# Patient Record
Sex: FEMALE | Race: WHITE | NOT HISPANIC OR LATINO | Employment: OTHER | URBAN - METROPOLITAN AREA
[De-identification: names, ages, dates, MRNs, and addresses within clinical notes are randomized per-mention and may not be internally consistent; named-entity substitution may affect disease eponyms.]

---

## 2021-10-23 ENCOUNTER — TELEPHONE (OUTPATIENT)
Dept: SCHEDULING | Facility: IMAGING CENTER | Age: 61
End: 2021-10-23

## 2021-10-26 ENCOUNTER — OFFICE VISIT (OUTPATIENT)
Dept: MEDICAL GROUP | Facility: LAB | Age: 61
End: 2021-10-26
Payer: COMMERCIAL

## 2021-10-26 VITALS
BODY MASS INDEX: 37.07 KG/M2 | DIASTOLIC BLOOD PRESSURE: 78 MMHG | HEART RATE: 76 BPM | SYSTOLIC BLOOD PRESSURE: 120 MMHG | OXYGEN SATURATION: 98 % | TEMPERATURE: 97.5 F | WEIGHT: 236.2 LBS | RESPIRATION RATE: 14 BRPM | HEIGHT: 67 IN

## 2021-10-26 DIAGNOSIS — R41.3 MEMORY CHANGES: ICD-10-CM

## 2021-10-26 DIAGNOSIS — Z00.00 ANNUAL PHYSICAL EXAM: ICD-10-CM

## 2021-10-26 DIAGNOSIS — E66.9 OBESITY (BMI 35.0-39.9 WITHOUT COMORBIDITY): ICD-10-CM

## 2021-10-26 DIAGNOSIS — G47.39 SLEEP APNEA-LIKE BEHAVIOR: ICD-10-CM

## 2021-10-26 DIAGNOSIS — Z76.89 ENCOUNTER TO ESTABLISH CARE WITH NEW DOCTOR: ICD-10-CM

## 2021-10-26 PROBLEM — R19.8 GI SYMPTOMS: Status: ACTIVE | Noted: 2021-04-30

## 2021-10-26 PROBLEM — K62.5 RECTAL BLEEDING: Status: ACTIVE | Noted: 2020-10-27

## 2021-10-26 PROCEDURE — 99204 OFFICE O/P NEW MOD 45 MIN: CPT | Performed by: FAMILY MEDICINE

## 2021-10-27 ENCOUNTER — HOSPITAL ENCOUNTER (OUTPATIENT)
Dept: LAB | Facility: MEDICAL CENTER | Age: 61
End: 2021-10-27
Attending: FAMILY MEDICINE
Payer: COMMERCIAL

## 2021-10-27 DIAGNOSIS — Z00.00 ANNUAL PHYSICAL EXAM: ICD-10-CM

## 2021-10-27 DIAGNOSIS — R41.3 MEMORY CHANGES: ICD-10-CM

## 2021-10-27 LAB
ALBUMIN SERPL BCP-MCNC: 4.6 G/DL (ref 3.2–4.9)
ALBUMIN/GLOB SERPL: 1.6 G/DL
ALP SERPL-CCNC: 84 U/L (ref 30–99)
ALT SERPL-CCNC: 19 U/L (ref 2–50)
ANION GAP SERPL CALC-SCNC: 10 MMOL/L (ref 7–16)
AST SERPL-CCNC: 20 U/L (ref 12–45)
BASOPHILS # BLD AUTO: 0.9 % (ref 0–1.8)
BASOPHILS # BLD: 0.05 K/UL (ref 0–0.12)
BILIRUB SERPL-MCNC: 0.4 MG/DL (ref 0.1–1.5)
BUN SERPL-MCNC: 16 MG/DL (ref 8–22)
CALCIUM SERPL-MCNC: 9.6 MG/DL (ref 8.5–10.5)
CHLORIDE SERPL-SCNC: 105 MMOL/L (ref 96–112)
CHOLEST SERPL-MCNC: 217 MG/DL (ref 100–199)
CO2 SERPL-SCNC: 26 MMOL/L (ref 20–33)
CREAT SERPL-MCNC: 0.79 MG/DL (ref 0.5–1.4)
EOSINOPHIL # BLD AUTO: 0.39 K/UL (ref 0–0.51)
EOSINOPHIL NFR BLD: 7.3 % (ref 0–6.9)
ERYTHROCYTE [DISTWIDTH] IN BLOOD BY AUTOMATED COUNT: 42.9 FL (ref 35.9–50)
FASTING STATUS PATIENT QL REPORTED: NORMAL
GLOBULIN SER CALC-MCNC: 2.9 G/DL (ref 1.9–3.5)
GLUCOSE SERPL-MCNC: 101 MG/DL (ref 65–99)
HCT VFR BLD AUTO: 43.9 % (ref 37–47)
HDLC SERPL-MCNC: 56 MG/DL
HGB BLD-MCNC: 14.9 G/DL (ref 12–16)
IMM GRANULOCYTES # BLD AUTO: 0.02 K/UL (ref 0–0.11)
IMM GRANULOCYTES NFR BLD AUTO: 0.4 % (ref 0–0.9)
LDLC SERPL CALC-MCNC: 141 MG/DL
LYMPHOCYTES # BLD AUTO: 2.39 K/UL (ref 1–4.8)
LYMPHOCYTES NFR BLD: 44.8 % (ref 22–41)
MCH RBC QN AUTO: 31.6 PG (ref 27–33)
MCHC RBC AUTO-ENTMCNC: 33.9 G/DL (ref 33.6–35)
MCV RBC AUTO: 93.2 FL (ref 81.4–97.8)
MONOCYTES # BLD AUTO: 0.5 K/UL (ref 0–0.85)
MONOCYTES NFR BLD AUTO: 9.4 % (ref 0–13.4)
NEUTROPHILS # BLD AUTO: 1.99 K/UL (ref 2–7.15)
NEUTROPHILS NFR BLD: 37.2 % (ref 44–72)
NRBC # BLD AUTO: 0 K/UL
NRBC BLD-RTO: 0 /100 WBC
PLATELET # BLD AUTO: 222 K/UL (ref 164–446)
PMV BLD AUTO: 9.9 FL (ref 9–12.9)
POTASSIUM SERPL-SCNC: 4.4 MMOL/L (ref 3.6–5.5)
PROT SERPL-MCNC: 7.5 G/DL (ref 6–8.2)
RBC # BLD AUTO: 4.71 M/UL (ref 4.2–5.4)
SODIUM SERPL-SCNC: 141 MMOL/L (ref 135–145)
T4 FREE SERPL-MCNC: 1.18 NG/DL (ref 0.93–1.7)
TRIGL SERPL-MCNC: 101 MG/DL (ref 0–149)
TSH SERPL DL<=0.005 MIU/L-ACNC: 1.83 UIU/ML (ref 0.38–5.33)
VIT B12 SERPL-MCNC: 682 PG/ML (ref 211–911)
WBC # BLD AUTO: 5.3 K/UL (ref 4.8–10.8)

## 2021-10-27 PROCEDURE — 80053 COMPREHEN METABOLIC PANEL: CPT

## 2021-10-27 PROCEDURE — 80061 LIPID PANEL: CPT

## 2021-10-27 PROCEDURE — 36415 COLL VENOUS BLD VENIPUNCTURE: CPT

## 2021-10-27 PROCEDURE — 84443 ASSAY THYROID STIM HORMONE: CPT

## 2021-10-27 PROCEDURE — 85025 COMPLETE CBC W/AUTO DIFF WBC: CPT

## 2021-10-27 PROCEDURE — 84439 ASSAY OF FREE THYROXINE: CPT

## 2021-10-27 PROCEDURE — 82607 VITAMIN B-12: CPT

## 2021-10-27 NOTE — PROGRESS NOTES
CC: Here to establish care    HPI: New patient  Claudia presents today to establish care, 61 years old female with past medical history significant for obesity, moved recently from [place].  Discussed the following today:    1. 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    2. Obesity (BMI 35.0-39.9 without comorbidity)  BMI 36.9, patient is with ready mindset to start losing weight, discussed with the patient to schedule an appointment for official weight loss medical weight loss and obesity counseling.  Requested labs today    3. Memory changes  New concern, patient said she has been noticing that she is her memory is not as sharp as she used to be, and this has been going on for at least more than 1 year now.  Denies depression or stress or anxiety.  No recent blood work or check for her thyroid function test.    4. Annual physical exam  Patient due for annual labs    5. Sleep apnea-like behavior  As per her partner who is accompanying her today, patient snores all night long, not sure if she stops breathing, patient with other risk factor class III obesity.  Partner with obstructive sleep apnea using CPAP and he said he used to snore when he was diagnosed with sleep apnea.  Patient agrees and would like to screen for sleep apnea.  Currently above    Patient Active Problem List    Diagnosis Date Noted   • Encounter to establish care with new doctor 10/26/2021   • Obesity (BMI 35.0-39.9 without comorbidity) 10/26/2021   • GI symptoms 04/30/2021   • Rectal bleeding 10/27/2020   • Prediabetes 03/23/2011   • Intrinsic urethral sphincter deficiency 08/24/2010       No current outpatient medications on file.     No current facility-administered medications for this visit.         Allergies as of 10/26/2021   •  (No Known Allergies)        ROS: Denies any chest pain, Shortness of breath, Changes bowel or bladder, Lower extremity edema.    Physical Exam:  Gen.: Well-developed, well-nourished, no apparent distress,pleasant and cooperative with the examination  Skin:  Warm and dry with good turgor. No rashes or suspicious lesions in visible areas  Eye: PERRLA, conjunctiva and sclera clear, lids normal  HEENT: Normocephalic/atraumatic, sinuses nontender with palpation, TMs clear, nares patent with pink mucosa and clear rhinorrhea, lips without lesions, oropharynx clear.  Neck: Trachea midline,no masses or adenopathy  Thyroid: normal consistency and size. No masses or nodules. Not tender with palpation.  Cor: Regular rate and rhythm without murmur, gallop or rub.  Lungs: Respirations unlabored.Clear to auscultation with equal breath sounds bilaterally. No wheezes, rhonchi.  Abdomen: Soft nontender without hepatosplenomegaly or masses appreciated, normoactive bowel sounds. No hernias.  Extremities: No cyanosis, clubbing or edema, Symmetrical without deformities or malformations. Pulses 2+ and symmetrical both upper and lower extremities  Lymphatic: No abnormal adenopathy of the neck groin or axillae.  Psych: Alert and oriented x 3.Normal affect, judgement,insight and memory.        Assessment and Plan.   61 y.o. female here to establish care    1. Encounter to establish care with new doctor  Reviewed medical history today as above, health maintenance topic reviewed, up-to-date    2. Obesity (BMI 35.0-39.9 without comorbidity)  Chronic problem, not controlled patient will schedule medical weight loss appointment for further evaluation and management    3. Memory changes  New problem, chronic for the patient, rule out medical causes like thyroid disease.  Will further evaluate next visit.  - TSH; Future  - FREE THYROXINE; Future  - VITAMIN B12; Future    4. Annual physical exam  Due for annual labs  - CBC WITH DIFFERENTIAL;  Future  - Comp Metabolic Panel; Future  - TSH; Future  - FREE THYROXINE; Future  - Lipid Profile; Future    5. Sleep apnea-like behavior  Referred for sleep studies.,  Discussed long-term management with weight loss  - REFERRAL TO PULMONARY AND SLEEP MEDICINE    Please note that this dictation was created using voice recognition software. I have made every reasonable attempt to correct obvious errors but there may be errors of grammar and content that I may have overlooked prior to finalization of this note.

## 2021-11-23 ENCOUNTER — OFFICE VISIT (OUTPATIENT)
Dept: MEDICAL GROUP | Facility: LAB | Age: 61
End: 2021-11-23
Payer: COMMERCIAL

## 2021-11-23 ENCOUNTER — HOSPITAL ENCOUNTER (OUTPATIENT)
Dept: RADIOLOGY | Facility: MEDICAL CENTER | Age: 61
End: 2021-11-23
Attending: FAMILY MEDICINE
Payer: COMMERCIAL

## 2021-11-23 VITALS
HEART RATE: 67 BPM | OXYGEN SATURATION: 95 % | SYSTOLIC BLOOD PRESSURE: 124 MMHG | RESPIRATION RATE: 16 BRPM | BODY MASS INDEX: 37.04 KG/M2 | DIASTOLIC BLOOD PRESSURE: 86 MMHG | WEIGHT: 236 LBS | TEMPERATURE: 97.3 F | HEIGHT: 67 IN

## 2021-11-23 DIAGNOSIS — M25.562 ACUTE PAIN OF LEFT KNEE: ICD-10-CM

## 2021-11-23 DIAGNOSIS — E78.49 OTHER HYPERLIPIDEMIA: ICD-10-CM

## 2021-11-23 DIAGNOSIS — E66.9 OBESITY (BMI 35.0-39.9 WITHOUT COMORBIDITY): ICD-10-CM

## 2021-11-23 PROBLEM — Z12.11 ENCOUNTER FOR SCREENING COLONOSCOPY: Status: ACTIVE | Noted: 2020-12-15

## 2021-11-23 PROBLEM — Z11.59 ENCOUNTER FOR SCREENING FOR OTHER VIRAL DISEASES: Status: ACTIVE | Noted: 2019-03-25

## 2021-11-23 PROCEDURE — 73562 X-RAY EXAM OF KNEE 3: CPT | Mod: LT

## 2021-11-23 PROCEDURE — 99214 OFFICE O/P EST MOD 30 MIN: CPT | Performed by: FAMILY MEDICINE

## 2021-11-23 ASSESSMENT — FIBROSIS 4 INDEX: FIB4 SCORE: 1.240085047948982518

## 2021-11-24 NOTE — PROGRESS NOTES
Chief Complaint:   Chief Complaint   Patient presents with   • Weight Check     initial weight management   • Knee Pain     left knee       HPI: Established patient  Claudia Killian is a 61 y.o. female who presents for follow-up, discussed the following concerns as follow today:  Reviewed all lab work results and discussed with the patient today    1. Acute pain of left knee  Patient reports that she has been experiencing this pain for the past few weeks, denies any injury or trauma but she has been regularly exercising.  Notes more pain when she is climbing the stairs or walking for long time.  No direct injury or trauma or fall or tripping.  Denies instability of the joint or locking.    2. Obesity (BMI 35.0-39.9 without comorbidity)    Goal: Patient has long-term and short-term goal, her long-term is to be around 156, short-term goal to reach below 200.        Diet: Tries to eat high-protein diet low-carb.  And restrict calories.  Exercise and physical activity: Exercise indefinitely more than 300 minutes/week, different types of exercise and walking    Fluids intake and water: Patient says she does not drink even 2 L of water per day    Medications: No medications at this time      Previous trials for weight loss: Different commercial trials, like weight watchers, Cheri Russell, etc. also tried medical weight loss through [facility] which helped her lose and reach the weight of 215.  But she stopped following directions after that    Behavioral/emotional history:Challenges and barriers to weight loss:  She said should there is no specific barriers but she feels frustrated when she does not lose weight    Lab review: Reviewed labs, lipid profile discussed with the patient that is the abnormal concern today    3.hyperlipidemia  Calculated cardiac risk 3.5%    Past medical history, family history, social history and medications reviewed and updated in the record.   Current medications, problem list and allergies reviewed  in [facility]  Health maintenance topics are reviewed and updated.    Patient Active Problem List    Diagnosis Date Noted   • Other hyperlipidemia 11/23/2021   • Encounter to establish care with new doctor 10/26/2021   • Obesity (BMI 35.0-39.9 without comorbidity) 10/26/2021   • GI symptoms 04/30/2021   • Rectal bleeding 10/27/2020   • Prediabetes 03/23/2011   • Intrinsic urethral sphincter deficiency 08/24/2010     Family History   Problem Relation Age of Onset   • Dementia Mother    • Hypertension Mother      Social History     Socioeconomic History   • Marital status:      Spouse name: Not on file   • Number of children: Not on file   • Years of education: Not on file   • Highest education level: Not on file   Occupational History     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    Tobacco Use   • Smoking status: Never Smoker   • Smokeless tobacco: Never Used   Vaping Use   • Vaping Use: Not on file   Substance and Sexual Activity   • Alcohol use: Not on file     Comment: wine    • Drug use: Never   • Sexual activity: Yes     Partners: Male   Other Topics Concern   • Not on file   Social History Narrative   • Not on file     Social Determinants of Health     Financial Resource Strain:    • Difficulty of Paying Living Expenses: Not on file   Food Insecurity:    • Worried About Running Out of Food in the Last Year: Not on file   • Ran Out of Food in the Last Year: Not on file   Transportation Needs:    • Lack of Transportation (Medical): Not on file   • Lack of Transportation (Non-Medical): Not on file   Physical Activity:    • Days of Exercise per Week: Not on file   • Minutes of Exercise per Session: Not on file   Stress:    • Feeling of Stress : Not on file   Social Connections:    • Frequency of Communication with Friends and Family: Not on file   • Frequency of Social Gatherings with Friends and Family: Not on file   • Attends Denominational Services: Not on file   • Active Member of Clubs or Organizations: Not on file   •  "Attends Club or Organization Meetings: Not on file   • Marital Status: Not on file   Intimate Partner Violence:    • Fear of Current or Ex-Partner: Not on file   • Emotionally Abused: Not on file   • Physically Abused: Not on file   • Sexually Abused: Not on file   Housing Stability:    • Unable to Pay for Housing in the Last Year: Not on file   • Number of Places Lived in the Last Year: Not on file   • Unstable Housing in the Last Year: Not on file       No current outpatient medications on file.     No current facility-administered medications for this visit.         Review Of Systems  As documented in HPI above  PHYSICAL EXAMINATION:    /86 (BP Location: Right arm, Patient Position: Sitting, BP Cuff Size: Adult)   Pulse 67   Temp 36.3 °C (97.3 °F) (Temporal)   Resp 16   Ht 1.702 m (5' 7\")   Wt 107 kg (236 lb)   SpO2 95%   BMI 36.96 kg/m²   Gen.: Well-developed, well-nourished, no apparent distress, pleasant and cooperative with the examination  HEENT: Normocephalic/atraumatic,   Neck: No JVD or bruits, no adenopathy  Cor: Regular rate and rhythm without murmur gallop or rub  Lungs: Clear to auscultation with equal breath sounds bilaterally. No wheezes, rhonchi.  Abdomen: Soft nontender without hepatosplenomegaly or masses appreciated, normoactive bowel sounds  Extremities: No cyanosis, clubbing or edema  Knee exam benign except for mild cracking sensation    ASSESSMENT/Plan:  1. Acute pain of left knee   new concern, discussed with the patient to do an x-ray, and will send her to do physical therapy as a means to strengthen the quadriceps muscle.  DX-KNEE 3 VIEWS LEFT    Referral to Physical Therapy   2. Obesity (BMI 35.0-39.9 without comorbidity)   new, chronic problem for the patient, uncontrolled.  Discussed the following today:    Diet: After calculating patient BMR which is around 1600 advised to cut back 500 arcadio/day from her BMR calculated, which will put her on a range of 1400 to 1200 " arcadio/day  Protein around 60 to 70 g/day  Diet mainly consistent of high-protein, low carb, low fiber, low fat.  Patient to take 3 meals and 1 snack per day and she can increase it to 2 snacks if she is feeling hungry.  Mainly the breakfast and lunch would be meal substitute, patient preferred to use protein bar and protein shake, discussed with the patient the criteria to choose her protein shakes or bars for calorie protein ratio as 10:1.  Fluids intake will advise water around 64 or more ounces per day  Avoid juices and sodas, allowed for diet soda occasionally, no artificial sweeteners  Exercise   4-5 times a week 30 minutes of walk will increase gradually, no barriers for exercise activity   3. Other hyperlipidemia   new, low cardiac risk 3.5%, discussed weight loss and healthy lifestyle changes       Please note that this dictation was created using voice recognition software. I have made every reasonable attempt to correct obvious errors but there may be errors of grammar and content that I may have overlooked prior to finalization of this note.

## 2021-12-14 ENCOUNTER — OFFICE VISIT (OUTPATIENT)
Dept: MEDICAL GROUP | Facility: LAB | Age: 61
End: 2021-12-14
Payer: COMMERCIAL

## 2021-12-14 VITALS
RESPIRATION RATE: 16 BRPM | HEART RATE: 85 BPM | DIASTOLIC BLOOD PRESSURE: 86 MMHG | TEMPERATURE: 96.8 F | WEIGHT: 227 LBS | HEIGHT: 67 IN | SYSTOLIC BLOOD PRESSURE: 124 MMHG | OXYGEN SATURATION: 95 % | BODY MASS INDEX: 35.63 KG/M2

## 2021-12-14 DIAGNOSIS — E66.9 OBESITY (BMI 35.0-39.9 WITHOUT COMORBIDITY): ICD-10-CM

## 2021-12-14 PROCEDURE — 99213 OFFICE O/P EST LOW 20 MIN: CPT | Performed by: FAMILY MEDICINE

## 2021-12-14 ASSESSMENT — FIBROSIS 4 INDEX: FIB4 SCORE: 1.240085047948982518

## 2021-12-14 NOTE — PROGRESS NOTES
Chief Complaint:   Chief Complaint   Patient presents with   • Weight Check       HPI: Established patient  Claudia Killian is a 61 y.o. female who presents for    Obesity (BMI 35.0-39.9 without comorbidity)    Patient lost 10 pounds since last visit couple of weeks ago, patient said she continues to exercise definitely more than 300 minutes/week, drinks around 2 L of water daily, and watches her proteins to eat around 60 g/day, along with calorie deficiency to around 1400.  Denies any problems with the routine and the regimen that we discussed earlier, patient said she feels okay and she feels motivated.    Reports that her knee pain has resolved at this time.    Past medical history, family history, social history and medications reviewed and updated in the record.  Today  Current medications, problem list and allergies reviewed in [facility] today  Health maintenance topics are reviewed and updated.    Patient Active Problem List    Diagnosis Date Noted   • Other hyperlipidemia 11/23/2021   • Encounter to establish care with new doctor 10/26/2021   • Obesity (BMI 35.0-39.9 without comorbidity) 10/26/2021   • GI symptoms 04/30/2021   • Encounter for screening colonoscopy 12/15/2020   • Rectal bleeding 10/27/2020   • Encounter for screening for other viral diseases 03/25/2019   • Prediabetes 03/23/2011   • Intrinsic urethral sphincter deficiency 08/24/2010     Family History   Problem Relation Age of Onset   • Dementia Mother    • Hypertension Mother      Social History     Socioeconomic History   • Marital status:      Spouse name: Not on file   • Number of children: Not on file   • Years of education: Not on file   • Highest education level: Not on file   Occupational History     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    Tobacco Use   • Smoking status: Never Smoker   • Smokeless tobacco: Never Used   Vaping Use   • Vaping Use: Not on file   Substance and Sexual Activity   • Alcohol use: Not on file     Comment: wine   "  • Drug use: Never   • Sexual activity: Yes     Partners: Male   Other Topics Concern   • Not on file   Social History Narrative   • Not on file     Social Determinants of Health     Financial Resource Strain:    • Difficulty of Paying Living Expenses: Not on file   Food Insecurity:    • Worried About Running Out of Food in the Last Year: Not on file   • Ran Out of Food in the Last Year: Not on file   Transportation Needs:    • Lack of Transportation (Medical): Not on file   • Lack of Transportation (Non-Medical): Not on file   Physical Activity:    • Days of Exercise per Week: Not on file   • Minutes of Exercise per Session: Not on file   Stress:    • Feeling of Stress : Not on file   Social Connections:    • Frequency of Communication with Friends and Family: Not on file   • Frequency of Social Gatherings with Friends and Family: Not on file   • Attends Muslim Services: Not on file   • Active Member of Clubs or Organizations: Not on file   • Attends Club or Organization Meetings: Not on file   • Marital Status: Not on file   Intimate Partner Violence:    • Fear of Current or Ex-Partner: Not on file   • Emotionally Abused: Not on file   • Physically Abused: Not on file   • Sexually Abused: Not on file   Housing Stability:    • Unable to Pay for Housing in the Last Year: Not on file   • Number of Places Lived in the Last Year: Not on file   • Unstable Housing in the Last Year: Not on file     No current outpatient medications on file.     No current facility-administered medications for this visit.           Review Of Systems  As documented in HPI above  PHYSICAL EXAMINATION:    /86 (BP Location: Right arm, Patient Position: Sitting, BP Cuff Size: Adult)   Pulse 85   Temp 36 °C (96.8 °F) (Temporal)   Resp 16   Ht 1.702 m (5' 7\")   Wt 103 kg (227 lb) Comment: 50.5inch waist  SpO2 95%   BMI 35.55 kg/m²   Gen.: Well-developed, well-nourished, no apparent distress, pleasant and cooperative with the " examination  HEENT: Normocephalic/atraumatic,   Neck: No JVD or bruits, no adenopathy  Cor: Regular rate and rhythm without murmur gallop or rub  Lungs: Clear to auscultation with equal breath sounds bilaterally. No wheezes, rhonchi.    Extremities: No cyanosis, clubbing or edema          ASSESSMENT/Plan:  1. Obesity (BMI 35.0-39.9 without comorbidity)   chronic, better controlled.  Lost 10 pounds, congratulated and advised to continue same regimen high-protein diet high fiber, low carb and fat, exercise more than 150 minutes/week, continue and increase hydration and water intake to above 64 ounces daily, and calorie deficiency at [id number].per day        Please note that this dictation was created using voice recognition software. I have made every reasonable attempt to correct obvious errors but there may be errors of grammar and content that I may have overlooked prior to finalization of this note.

## 2022-01-06 ENCOUNTER — OFFICE VISIT (OUTPATIENT)
Dept: SLEEP MEDICINE | Facility: MEDICAL CENTER | Age: 62
End: 2022-01-06
Payer: COMMERCIAL

## 2022-01-06 VITALS
RESPIRATION RATE: 16 BRPM | HEIGHT: 67 IN | DIASTOLIC BLOOD PRESSURE: 84 MMHG | BODY MASS INDEX: 36.41 KG/M2 | OXYGEN SATURATION: 96 % | WEIGHT: 232 LBS | SYSTOLIC BLOOD PRESSURE: 128 MMHG | HEART RATE: 71 BPM

## 2022-01-06 DIAGNOSIS — G47.30 SLEEP DISORDER BREATHING: ICD-10-CM

## 2022-01-06 PROCEDURE — 99203 OFFICE O/P NEW LOW 30 MIN: CPT | Performed by: FAMILY MEDICINE

## 2022-01-06 ASSESSMENT — FIBROSIS 4 INDEX: FIB4 SCORE: 1.240085047948982518

## 2022-01-06 NOTE — PROGRESS NOTES
"  [facility]  Consult Note     Date: 1/6/2022 / Time: 9:00 AM    Patient ID:   Name:             Claudia Killian   YOB: 1960  Age:                 61 y.o.  female   MRN:               [id number]      Thank you for requesting a sleep medicine consultation on Claudia Killian at the sleep center. He presents today with the chief complaints of snoring, pause in breathing and gasping breath. She is referred by Dr. Chapa for evaluation and treatment of sleep disorder breathing .     HISTORY OF PRESENT ILLNESS:       At night,  Claudia Killian goes to bed around 10-11 pm on weekdays and around on the weekends. She gets out of bed at 7-9 am on weekdays and on the weekends.  She averages about 8-10 hrs of sleep on a good night. She falls asleep within 5-10 minutes. She wakes up occasionally during the night due to bathroom use and on average It takes her few min to fall back asleep. She is  aware of snoring, breathing pauses and gasping in sleep.  She  denies any symptoms of restless legs syndrome such as an \"urge to move\"  She  legs in the evening or bedtime. She  denies any symptoms of narcolepsy such as sleep paralysis or cataplexy, or any symptoms to suggest parasomnias such as sleep walking or acting out of dreams. She  has not used any medications for the sleep problem.Overall, she does finds her sleep refreshing.In terms of  excessive daytime sleepiness,  She complains of sleepiness while reading, watching TV however denies while driving.She  does not take regular naps.She drinks about 1-3 caffeinated beverages per day.      REVIEW OF SYSTEMS:       Constitutional: Denies fevers, Denies weight changes  Eyes: Denies changes in vision, no eye pain  Ears/Nose/Throat/Mouth: Denies nasal congestion or sore throat   Cardiovascular: Denies chest pain or palpitations   Respiratory: Denies shortness of breath , Denies cough  Gastrointestinal/Hepatic: Denies abdominal pain, nausea, vomiting, diarrhea, " "constipation or GI bleeding   Genitourinary: Denies bladder dysfunction, dysuria or frequency  Musculoskeletal/Rheum: Denies  joint pain and swelling   Skin/Breast: Denies rash  Neurological: Denies headache, confusion, memory loss or focal weakness/parasthesias  Psychiatric: denies mood disorder     Comprehensive review of systems form is reviewed with the patient and is attached in the EMR.     PMH:  has a past medical history of Chickenpox and [place] measles.  MEDS: No current outpatient medications on file.  ALLERGIES: No Known Allergies  SURGHX: History reviewed. No pertinent surgical history.  SOCHX:  reports that she has never smoked. She has never used smokeless tobacco. She reports current alcohol use. She reports that she does not use drugs.   FH:   Family History   Problem Relation Age of Onset   • Dementia Mother    • Hypertension Mother        Physical Exam:  Vitals/ General Appearance:   Weight/BMI: Body mass index is 36.34 kg/m².  /84 (BP Location: Left arm, Patient Position: Sitting, BP Cuff Size: Adult)   Pulse 71   Resp 16   Ht 1.702 m (5' 7\")   Wt 105 kg (232 lb)   SpO2 96%   Vitals:    01/06/22 0843   BP: 128/84   BP Location: Left arm   Patient Position: Sitting   BP Cuff Size: Adult   Pulse: 71   Resp: 16   SpO2: 96%   Weight: 105 kg (232 lb)   Height: 1.702 m (5' 7\")           Constitutional: Alert, no distress, well-groomed.  Skin: No rashes in visible areas.  Eye: Round. Conjunctiva clear, lids normal. No icterus.   ENMT: Lips pink without lesions, good dentition, moist mucous membranes. Phonation normal.  Neck: No masses, no thyromegaly. Moves freely without pain.  CV: Pulse as reported by patient  Respiratory: Unlabored respiratory effort, no cough or audible wheeze  Psych: Alert and oriented x3, normal affect and mood.   INVESTIGATIONS:       ASSESSMENT AND PLAN     1. She  has symptoms of Obstructive Sleep Apnea (CLARK). The pathophysiology of CLARK and the increased risk of " cardiovascular morbidity from untreated CLARK is discussed in detail with the patient. We have discussed diagnostic options including in-laboratory, attended polysomnography and home sleep testing. We have also discussed treatment options including airway pressurization, reconstructive otolaryngologic surgery, dental appliances and weight management.       Subsequently,treatment options will be discussed based on the diagnostic study. Meanwhile, She is urged to avoid supine sleep, weight gain and alcoholic beverages since all of these can worsen CLARK. She is cautioned against drowsy driving. If She feels sleepy while driving, She must pull over for a break/nap, rather than persist on the road, in the interest of She own safety and that of others on the road.    Plan  -  She  will be scheduled for an overnight HST to assess sleep related  breathing disorder.    2. Regarding treatment of other past medical problems and general health maintenance,  She is urged to follow up with PCP.

## 2022-02-08 ENCOUNTER — HOME STUDY (OUTPATIENT)
Dept: SLEEP MEDICINE | Facility: MEDICAL CENTER | Age: 62
End: 2022-02-08
Payer: COMMERCIAL

## 2022-02-08 DIAGNOSIS — G47.30 SLEEP DISORDER BREATHING: ICD-10-CM

## 2022-02-08 PROCEDURE — 95806 SLEEP STUDY UNATT&RESP EFFT: CPT | Performed by: FAMILY MEDICINE

## 2022-02-15 NOTE — PROCEDURES
DIAGNOSTIC HOME SLEEP TEST (HST) REPORT       PATIENT ID:  NAME:  Claudia Killian  MRN:               [id number]  YOB: 1960  DATE OF STUDY: 2/8/22      Impression:     This study shows evidence of:     1.Severe obstructive sleep apnea with  Respiratory Event Index (MISSAEL) of 42.8 per hour and worse in supine sleep with MISSAEL at 73.9. These findings are based on the recording time (flow evaluation time). It is not possible with this device to determine a traditional apnea+hypopnea index (AHI) for total sleep time since EEG channels are not available.     2. Nocturnal hypoxia: O2 Sat. griselda was 82% and mean O2 sat was 90% and baseline O2 at 93 %. O2 sat was below 88% for 19% of the flow evaluation time. Oxygen Desaturation (>=3%) Index was elevated at 45.2/hr. AVG HR was 65 BPM.      TECHNICAL DESCRIPTION:  [email or web address] Device used was a type-III home study device. Home sleep study recording included: Airflow recording by nasal pressure transducer; Respiratory Effort by abdominal Respiratory Bands; O2 by finger oximetry. A position sensor and a snore channel was also used.    Scoring Criteria: A modification of the the AASM Manual for the Scoring of Sleep and Associated Events, 2012, was used.   Obstructive apnea was scored by cessation of airflow for at least 10 seconds with continuing respiratory effort.  Central apnea was scored by cessation of airflow for at least 10 seconds with no effort.  Hypopnea was scored by a 30% or more reduction in airflow for at least 10 seconds accompanied by an arterial oxygen desaturation of 3% or more.  (For Medicare patients, hypopneas were scored by a 30% or more reduction in airflow for at least 10 seconds accompanied by an arterial oxygen saturation of 4% or more, as required by their insurance, CMS.        General sleep summary: . Total recording time is 8 hours and 12 minutes and total flow evaluation time is 7 hours and 56 minutes. The patient spent 2 hours and 48  minutes in the supine position.    Respiratory events:    Apneas: 42 (Obstructive apnea index 4/hr, Central apnea index 1.3 /hr, mixed 0 /hour)  Hypopneas: 300    Recommendations:    1. CPAP titration study vs Auto CPAP trial .   2.   In general patients with sleep apnea are advised to avoid alcohol and sedatives and to not operate a motor vehicle while drowsy. In some cases alternative treatment options may prove effective in resolving sleep apnea in these options include upper airway surgery, the use of a dental orthotic or weight loss and positional therapy. Clinical correlation is required.         Boaz Colon MD

## 2022-03-10 ENCOUNTER — OFFICE VISIT (OUTPATIENT)
Dept: SLEEP MEDICINE | Facility: MEDICAL CENTER | Age: 62
End: 2022-03-10
Payer: COMMERCIAL

## 2022-03-10 VITALS
OXYGEN SATURATION: 98 % | RESPIRATION RATE: 16 BRPM | BODY MASS INDEX: 35.47 KG/M2 | DIASTOLIC BLOOD PRESSURE: 82 MMHG | HEIGHT: 67 IN | SYSTOLIC BLOOD PRESSURE: 134 MMHG | HEART RATE: 61 BPM | WEIGHT: 226 LBS

## 2022-03-10 DIAGNOSIS — G47.33 OSA (OBSTRUCTIVE SLEEP APNEA): ICD-10-CM

## 2022-03-10 PROCEDURE — 99214 OFFICE O/P EST MOD 30 MIN: CPT | Performed by: FAMILY MEDICINE

## 2022-03-10 ASSESSMENT — FIBROSIS 4 INDEX: FIB4 SCORE: 1.26

## 2022-03-10 ASSESSMENT — PATIENT HEALTH QUESTIONNAIRE - PHQ9: CLINICAL INTERPRETATION OF PHQ2 SCORE: 0

## 2022-03-10 NOTE — PATIENT INSTRUCTIONS
"     CPAP and BPAP Information  CPAP and BPAP are methods of helping a person breathe with the use of air pressure. CPAP stands for \"continuous positive airway pressure.\" BPAP stands for \"bi-level positive airway pressure.\" In both methods, air is blown through your nose or mouth and into your air passages to help you breathe well.  CPAP and BPAP use different amounts of pressure to blow air. With CPAP, the amount of pressure stays the same while you breathe in and out. With BPAP, the amount of pressure is increased when you breathe in (inhale) so that you can take larger breaths. Your health care provider will recommend whether CPAP or BPAP would be more helpful for you.  Why are CPAP and BPAP treatments used?  CPAP or BPAP can be helpful if you have:  · Sleep apnea.  · Chronic obstructive pulmonary disease (COPD).  · Heart failure.  · Medical conditions that weaken the muscles of the chest including muscular dystrophy, or neurological diseases such as amyotrophic lateral sclerosis (ALS).  · Other problems that cause breathing to be weak, abnormal, or difficult.  CPAP is most commonly used for obstructive sleep apnea (CLARK) to keep the airways from collapsing when the muscles relax during sleep.  How is CPAP or BPAP administered?  Both CPAP and BPAP are provided by a small machine with a flexible plastic tube that attaches to a plastic mask. You wear the mask. Air is blown through the mask into your nose or mouth. The amount of pressure that is used to blow the air can be adjusted on the machine. Your health care provider will determine the pressure setting that should be used based on your individual needs.  When should CPAP or BPAP be used?  In most cases, the mask only needs to be worn during sleep. Generally, the mask needs to be worn throughout the night and during any daytime naps. People with certain medical conditions may also need to wear the mask at other times when they are awake. Follow instructions from " your health care provider about when to use the machine.  What are some tips for using the mask?    · Because the mask needs to be snug, some people feel trapped or closed-in (claustrophobic) when first using the mask. If you feel this way, you may need to get used to the mask. One way to do this is by holding the mask loosely over your nose or mouth and then gradually applying the mask more snugly. You can also gradually increase the amount of time that you use the mask.  · Masks are available in various types and sizes. Some fit over your mouth and nose while others fit over just your nose. If your mask does not fit well, talk with your health care provider about getting a different one.  · If you are using a mask that fits over your nose and you tend to breathe through your mouth, a chin strap may be applied to help keep your mouth closed.  · The CPAP and BPAP machines have alarms that may sound if the mask comes off or develops a leak.  · If you have trouble with the mask, it is very important that you talk with your health care provider about finding a way to make the mask easier to tolerate. Do not stop using the mask. Stopping the use of the mask could have a negative impact on your health.  What are some tips for using the machine?  · Place your CPAP or BPAP machine on a secure table or stand near an electrical outlet.  · Know where the on/off switch is located on the machine.  · Follow instructions from your health care provider about how to set the pressure on your machine and when you should use it.  · Do not eat or drink while the CPAP or BPAP machine is on. Food or fluids could get pushed into your lungs by the pressure of the CPAP or BPAP.  · Do not smoke. Tobacco smoke residue can damage the machine.  · For home use, CPAP and BPAP machines can be rented or purchased through home health care companies. Many different brands of machines are available. Renting a machine before purchasing may help you  "find out which particular machine works well for you.  · Keep the CPAP or BPAP machine and attachments clean. Ask your health care provider for specific instructions.  Get help right away if:  · You have redness or open areas around your nose or mouth where the mask fits.  · You have trouble using the CPAP or BPAP machine.  · You cannot tolerate wearing the CPAP or BPAP mask.  · You have pain, discomfort, and bloating in your abdomen.  Summary  · CPAP and BPAP are methods of helping a person breathe with the use of air pressure.  · Both CPAP and BPAP are provided by a small machine with a flexible plastic tube that attaches to a plastic mask.  · If you have trouble with the mask, it is very important that you talk with your health care provider about finding a way to make the mask easier to tolerate.  This information is not intended to replace advice given to you by your health care provider. Make sure you discuss any questions you have with your health care provider.  Document Released: 09/15/2005 Document Revised: 04/08/2020 Document Reviewed: 11/06/2017  [employer] Patient Education © 2020 [employer]  Once you receive your new PAP machine from the [employer] company you're referred to, we must see you back for an office visit between 30-90 days of you using the machine to review compliance.  If you were ordered an ASV machine, we need to see you in office no sooner than your 60th day on therapy.      This is a very important time frame for insurance purposes. If you do not follow up with our office for compliance your insurance may not continue to pay for the machine. Also if you do not use the machine for at least 4 hours each night, you may be deemed \"Incompliant\", in which case the insurance may also not continue to pay for the machine.      If you are incompliant, you may have to surrender your machine to the [employer] company and start the process over if you wish to continue therapy after that. " Meaning a new office consult and new sleep studies.     For your first visit back with our office, please bring the whole machine with the power cord. We will download the compliance off the SD card in the machine, and are able to make changes if need be in office. Some machines have a modem and we can access the data wirelessly, if this is the case please make sure the DME company grants us access to your machine.  For all follow up appointments after that, you will only need to bring the SD card to the appointment.     If you are having any issues with the mask, you have a 30 day window to exchange and try something else with your DME company.  If you are having issues with the pressure, please call our office at [phone].  These issues can cause you to not be able to use machine appropriately, there for make you incompliant.    Please call our office if you have any questions.    Thank you, [facility].  [phone]

## 2022-03-10 NOTE — PROGRESS NOTES
[facility] Follow Up Note     Date: 3/10/2022 / Time: 10:28 AM    Patient ID:   Name:             Claudia Killian   YOB: 1960  Age:                 62 y.o.  female   MRN:               [id number]      Thank you for requesting a sleep medicine consultation on Claudia Killian at the sleep center. She presents today with the chief complaints of CLARK and HST follow up.     HISTORY OF PRESENT ILLNESS:       Pt is currently not on therapy.  Denies any significant changes in sleep or medical history.  She goes to sleep around 10-11 pm on weekdays and around on the weekends. She gets out of bed at 7-9 am on weekdays and on the weekends.  She averages about 8-10 hrs of sleep on a good night. She falls asleep within 5-10 minutes. Overall, she does  finds her sleep refreshing. She denies any symptoms of RLS, narcolepsy or any symptoms to suggest parasomnias such as nightmares, sleep walking or acting out of dreams. The symptoms of excessive daytime, snoring and pausing breathing has continued..  Since her last visit she had a home sleep study on 2/8/2022 which showed Severe obstructive sleep apnea with  Respiratory Event Index (MISSAEL) of 42.8 per hour and worse in supine sleep with MISSAEL at 73.9. Nocturnal hypoxia: O2 Sat. griselda was 82% and mean O2 sat was 90% and baseline O2 at 93 %. O2 sat was below 88% for 19% of the flow evaluation time. Oxygen Desaturation (>=3%) Index was elevated at 45.2/hr. AVG HR was 65 BPM.      REVIEW OF SYSTEMS:       Constitutional: Denies fevers, Denies weight changes  Eyes: Denies changes in vision, no eye pain  Ears/Nose/Throat/Mouth: Denies nasal congestion or sore throat   Cardiovascular: Denies chest pain or palpitations   Respiratory: Denies shortness of breath , Denies cough  Gastrointestinal/Hepatic: Denies abdominal pain, nausea, vomiting, diarrhea, constipation or GI bleeding   Genitourinary: Deniesdysuria or frequency  Musculoskeletal/Rheum: Denies  joint pain  "and swelling   Skin/Breast: Denies rash,   Neurological: Denies headache, confusion, memory loss or focal weakness/parasthesias  Psychiatric: denies mood disorder   Sleep: Positive snoring, pausing breathing and gasping breath    Comprehensive review of systems form is reviewed with the patient and is attached in the EMR.     PMH:  has a past medical history of Chickenpox and Maltese measles.  MEDS: No current outpatient medications on file.  ALLERGIES: No Known Allergies  SURGHX: No past surgical history on file.  SOCHX:  reports that she has never smoked. She has never used smokeless tobacco. She reports current alcohol use. She reports that she does not use drugs..  FH:   Family History   Problem Relation Age of Onset   • Dementia Mother    • Hypertension Mother          Physical Exam:  Vitals/ General Appearance:   Weight/BMI: Body mass index is 35.4 kg/m².  /82 (BP Location: Left arm, Patient Position: Sitting, BP Cuff Size: Adult)   Pulse 61   Resp 16   Ht 1.702 m (5' 7\")   Wt 103 kg (226 lb)   SpO2 98%   Vitals:    03/10/22 1024   BP: 134/82   BP Location: Left arm   Patient Position: Sitting   BP Cuff Size: Adult   Pulse: 61   Resp: 16   SpO2: 98%   Weight: 103 kg (226 lb)   Height: 1.702 m (5' 7\")       Pt. is alert and oriented to time, place and person. Cooperative and in no apparent distress.       Constitutional: Alert, no distress, well-groomed.  Skin: No rashes in visible areas.  Eye: Round. Conjunctiva clear, lids normal. No icterus.   ENMT: Lips pink without lesions, good dentition, moist mucous membranes. Phonation normal.  Neck: No masses, no thyromegaly. Moves freely without pain.  CV: Pulse as reported by patient  Respiratory: Unlabored respiratory effort, no cough or audible wheeze  Psych: Alert and oriented x3, normal affect and mood.     ASSESSMENT AND PLAN     1.  Obstructive sleep Apnea  The pathophysiology of sleep anea and the increased risk of cardiovascular morbidity from " untreated sleep apnea is discussed in detail with the patient.   She is urged to avoid supine sleep, weight gain and alcoholic beverages since all of these can worsen sleep apnea. She is cautioned against drowsy driving. If She feels sleepy while driving, She must pull over for a break/nap, rather than persist on the road, in the interest of She own safety and that of others on the road.   Plan     - Auto CPAP vs overnight CPAP titration vs dental appliance and surgeries was dicussed in detail. After informed discussion ACPAP 5-15 cm is ordered today with MOC per pt    - F/u in 8-10 weeks to assess the efficiacy of recommended pressure    -Home sleep study was reviewed and discussed with the pt   - compliance was reinforced     2. Regarding treatment of other past medical problems and general health maintenance,  She is urged to follow up with PCP.

## 2022-04-29 ENCOUNTER — OFFICE VISIT (OUTPATIENT)
Dept: MEDICAL GROUP | Facility: LAB | Age: 62
End: 2022-04-29
Payer: COMMERCIAL

## 2022-04-29 VITALS
HEIGHT: 67 IN | WEIGHT: 232 LBS | TEMPERATURE: 97.5 F | HEART RATE: 76 BPM | DIASTOLIC BLOOD PRESSURE: 70 MMHG | RESPIRATION RATE: 16 BRPM | BODY MASS INDEX: 36.41 KG/M2 | SYSTOLIC BLOOD PRESSURE: 130 MMHG | OXYGEN SATURATION: 96 %

## 2022-04-29 DIAGNOSIS — E66.9 OBESITY (BMI 35.0-39.9 WITHOUT COMORBIDITY): ICD-10-CM

## 2022-04-29 DIAGNOSIS — L98.9 SKIN LESION: ICD-10-CM

## 2022-04-29 DIAGNOSIS — E78.49 OTHER HYPERLIPIDEMIA: ICD-10-CM

## 2022-04-29 PROCEDURE — 99213 OFFICE O/P EST LOW 20 MIN: CPT | Performed by: FAMILY MEDICINE

## 2022-04-29 ASSESSMENT — FIBROSIS 4 INDEX: FIB4 SCORE: 1.26

## 2022-04-29 NOTE — PROGRESS NOTES
Chief Complaint:   Chief Complaint   Patient presents with   • Weight Check       HPI: Established patient  Claudia Killian is a 62 y.o. female who presents for follow-up on weight management    1. Obesity (BMI 35.0-39.9 without comorbidity)  Patient said she was off track and not monitoring her diet as she used to, so she gained around 2 or 3 pounds.  Since last visit.  Discussed with the patient medications and to go back on track and she is willing to do that.  Patient has a history of prediabetes.  So discussed with her GLP 1 agonist to see if that can help with weight loss and to control blood sugar.  Denies history of thyroid tumors, no family history of thyroid tumors or thyroid disease.  No history of pancreatitis.    2. Other hyperlipidemia  LDL around 141.  Calculated cardiac risk around 7.5%.  Discussed with the patient CT calcium scoring to assess cardiac risk.  Not taking statins at this time    3. Skin lesion  New concern, resolved at this time.  She said she noticed this skin lesion on the lower eyelid on the right eye, and it resolved on its own.  No concerns at this time.  She just wanted to let me know and to check on it.            Past medical history, family history, social history and medications reviewed and updated in the record.  Today  Current medications, problem list and allergies reviewed in EPIC today  Health maintenance topics are reviewed and updated.    Patient Active Problem List    Diagnosis Date Noted   • Other hyperlipidemia 11/23/2021   • Encounter to establish care with new doctor 10/26/2021   • Obesity (BMI 35.0-39.9 without comorbidity) 10/26/2021   • GI symptoms 04/30/2021   • Encounter for screening colonoscopy 12/15/2020   • Rectal bleeding 10/27/2020   • Encounter for screening for other viral diseases 03/25/2019   • Prediabetes 03/23/2011   • Intrinsic urethral sphincter deficiency 08/24/2010     Family History   Problem Relation Age of Onset   • Dementia Mother    •  "Hypertension Mother      Social History     Socioeconomic History   • Marital status:      Spouse name: Not on file   • Number of children: Not on file   • Years of education: Not on file   • Highest education level: Not on file   Occupational History     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    Tobacco Use   • Smoking status: Never Smoker   • Smokeless tobacco: Never Used   Vaping Use   • Vaping Use: Not on file   Substance and Sexual Activity   • Alcohol use: Yes     Comment: 1-2 times a week    • Drug use: Never   • Sexual activity: Yes     Partners: Male   Other Topics Concern   • Not on file   Social History Narrative   • Not on file     Social Determinants of Health     Financial Resource Strain: Not on file   Food Insecurity: Not on file   Transportation Needs: Not on file   Physical Activity: Not on file   Stress: Not on file   Social Connections: Not on file   Intimate Partner Violence: Not on file   Housing Stability: Not on file       Current Outpatient Medications   Medication Sig Dispense Refill   • Semaglutide (WEGOVY) 0.25 MG/0.5ML Solution Auto-injector Pen-injector Inject 0.5 mL under the skin every 7 days. 4 Each 3     No current facility-administered medications for this visit.         Review Of Systems  As documented in HPI above  PHYSICAL EXAMINATION:    /70 (BP Location: Right arm, Patient Position: Sitting, BP Cuff Size: Adult)   Pulse 76   Temp 36.4 °C (97.5 °F) (Temporal)   Resp 16   Ht 1.702 m (5' 7\")   Wt 105 kg (232 lb)   SpO2 96%   BMI 36.34 kg/m²   Gen.: Well-developed, well-nourished, no apparent distress, pleasant and cooperative with the examination  HEENT: Normocephalic/atraumatic, sinuses nontender with palpation, TMs clear, nares patent with pink mucosa and clear rhinorrhea, oropharynx clear, bilateral eye exam within normal limits no skin lesions noted.      Neck: No JVD or bruits, no adenopathy  Cor: Regular rate and rhythm without murmur gallop or " rub    Extremities: No cyanosis, clubbing or edema          ASSESSMENT/Plan:  1. Obesity (BMI 35.0-39.9 without comorbidity)   chronic, uncontrolled.  Patient advised to call back for lifestyle changes and diet control, keep calorie intake per day around [id number], high-protein diet minimum of 60 g/day, 64 ounces of water minimum.  And to exercise more than 150 minutes/week.  I will start patient on Wegovy for weight loss.  Follow-up in couple of weeks.  Discussed medication side effects and answered the patient questions.  Semaglutide (WEGOVY) 0.25 MG/0.5ML Solution Auto-injector Pen-injector   2. Other hyperlipidemia   chronic, not on statins.  Advised to do calcium scoring to assess cardiac risk  CT-HEART W/O CONT EVAL CALCIUM   3. Skin lesion   problem resolved at this time reassured watchful waiting if any recurrence of the lesion to come back and showed to me.       Please note that this dictation was created using voice recognition software. I have made every reasonable attempt to correct obvious errors but there may be errors of grammar and content that I may have overlooked prior to finalization of this note.

## 2022-05-05 ENCOUNTER — TELEPHONE (OUTPATIENT)
Dept: MEDICAL GROUP | Facility: LAB | Age: 62
End: 2022-05-05
Payer: COMMERCIAL

## 2022-05-05 NOTE — TELEPHONE ENCOUNTER
DOCUMENTATION OF PAR STATUS:    1. Name of Medication & Dose: Wegovy      2. Name of Prescription Coverage Company & phone #: [employer]     3. Date Prior Auth Submitted: 5/5/22    4. What information was given to obtain insurance decision? diagnoses    5. Prior Auth Status? Approved    6. Patient Notified: no- faxed to pharmacy    Covermymeds: KEY [id number]

## 2022-07-06 ENCOUNTER — HOSPITAL ENCOUNTER (OUTPATIENT)
Dept: RADIOLOGY | Facility: MEDICAL CENTER | Age: 62
End: 2022-07-06
Attending: FAMILY MEDICINE
Payer: COMMERCIAL

## 2022-07-06 DIAGNOSIS — E78.49 OTHER HYPERLIPIDEMIA: ICD-10-CM

## 2022-07-06 PROCEDURE — 4410556 CT-CARDIAC SCORING (SELF PAY ONLY)

## 2022-07-26 ENCOUNTER — OFFICE VISIT (OUTPATIENT)
Dept: SLEEP MEDICINE | Facility: MEDICAL CENTER | Age: 62
End: 2022-07-26
Payer: COMMERCIAL

## 2022-07-26 VITALS
RESPIRATION RATE: 16 BRPM | DIASTOLIC BLOOD PRESSURE: 82 MMHG | WEIGHT: 233.6 LBS | HEIGHT: 68 IN | BODY MASS INDEX: 35.4 KG/M2 | OXYGEN SATURATION: 98 % | SYSTOLIC BLOOD PRESSURE: 128 MMHG | HEART RATE: 70 BPM

## 2022-07-26 DIAGNOSIS — G47.33 OSA (OBSTRUCTIVE SLEEP APNEA): ICD-10-CM

## 2022-07-26 PROCEDURE — 99213 OFFICE O/P EST LOW 20 MIN: CPT | Performed by: FAMILY MEDICINE

## 2022-07-26 ASSESSMENT — PATIENT HEALTH QUESTIONNAIRE - PHQ9: CLINICAL INTERPRETATION OF PHQ2 SCORE: 0

## 2022-07-26 ASSESSMENT — FIBROSIS 4 INDEX: FIB4 SCORE: 1.26

## 2022-07-26 NOTE — PROGRESS NOTES
[facility] Follow Up Note     Date: 7/26/2022 / Time: 10:29 AM    Patient ID:   Name:             Claudia Killian   YOB: 1960  Age:                 62 y.o.  female   MRN:               [id number]      Thank you for requesting a sleep medicine consultation on Claudia Killian at the sleep center. She presents today with the chief complaints of CLARK follow up.     HISTORY OF PRESENT ILLNESS:       Pt is currently on ACPAP 5-15 cm. She goes to sleep around 10 pm and wakes up around 7 am. She is getting about 8 hrs of sleep on a good night Overall, she does  finds her sleep refreshing since she has started the therapy.She occasionally takes a nap. The naps are usually 15-20 min long. She denies any symptoms of RLS, narcolepsy or any symptoms to suggest parasomnias such as nightmares, sleep walking or acting out of dreams.      She is using CPAP most days of the week. Pt reports 5 hrs of average nightly use of CPAP. Pt denies snoring, gasping,choking.Pt also denies significant mask leak that is interfering with sleep. The 30 day compliance was downloaded which shows adequate compliance with more that 4 hr usage about 77%. The AHI is has improved to 1.9/hr. The mask leak is normal. The symptoms ofOSA has improved with the therapy.    SLEEP HISTORY   home sleep study on 2/8/2022 which showed Severe obstructive sleep apnea with  Respiratory Event Index (MISSAEL) of 42.8 per hour and worse in supine sleep with MISSAEL at 73.9. Nocturnal hypoxia: O2 Sat. griselda was 82% and mean O2 sat was 90% and baseline O2 at 93 %. O2 sat was below 88% for 19% of the flow evaluation time. Oxygen Desaturation (>=3%) Index was elevated at 45.2/hr. AVG HR was 65 BPM      REVIEW OF SYSTEMS:       Constitutional: Denies fevers, Denies weight changes  Eyes: Denies changes in vision, no eye pain  Ears/Nose/Throat/Mouth: Denies nasal congestion or sore throat   Cardiovascular: Denies chest pain or palpitations   Respiratory:  "Denies shortness of breath , Denies cough  Gastrointestinal/Hepatic: Denies abdominal pain, nausea, vomiting, diarrhea, constipation or GI bleeding   Genitourinary: Deniesdysuria or frequency  Musculoskeletal/Rheum: Denies  joint pain and swelling   Skin/Breast: Denies rash,   Neurological: Denies headache, confusion, memory loss or focal weakness/parasthesias  Psychiatric: denies mood disorder   Sleep: denies snoring     Comprehensive review of systems form is reviewed with the patient and is attached in the EMR.     PMH:  has a past medical history of Chickenpox and [place] measles.  MEDS:   Current Outpatient Medications:   •  Semaglutide (WEGOVY) 0.25 MG/0.5ML Solution Auto-injector Pen-injector, Inject 0.5 mL under the skin every 7 days., Disp: 4 Each, Rfl: 3  ALLERGIES: No Known Allergies  SURGHX: No past surgical history on file.  SOCHX:  reports that she has never smoked. She has never used smokeless tobacco. She reports current alcohol use. She reports that she does not use drugs..  FH:   Family History   Problem Relation Age of Onset   • Dementia Mother    • Hypertension Mother          Physical Exam:  Vitals/ General Appearance:   Weight/BMI: Body mass index is 36.05 kg/m².  /82 (BP Location: Left arm, Patient Position: Sitting, BP Cuff Size: Adult)   Pulse 70   Resp 16   Ht 1.715 m (5' 7.5\")   Wt 106 kg (233 lb 9.6 oz)   SpO2 98%   Vitals:    07/26/22 1023   BP: 128/82   BP Location: Left arm   Patient Position: Sitting   BP Cuff Size: Adult   Pulse: 70   Resp: 16   SpO2: 98%   Weight: 106 kg (233 lb 9.6 oz)   Height: 1.715 m (5' 7.5\")       Pt. is alert and oriented to time, place and person. Cooperative and in no apparent distress.       Constitutional: Alert, no distress, well-groomed.  Skin: No rashes in visible areas.  Eye: Round. Conjunctiva clear, lids normal. No icterus.   ENMT: Lips pink without lesions, good dentition, moist mucous membranes. Phonation normal.  Neck: No masses, no " thyromegaly. Moves freely without pain.  CV: Pulse as reported by patient  Respiratory: Unlabored respiratory effort, no cough or audible wheeze  Psych: Alert and oriented x3, normal affect and mood.     ASSESSMENT AND PLAN     1. Sleep Apnea .   She is urged to avoid supine sleep, weight gain and alcoholic beverages since all of these can worsen sleep apnea. She is cautioned against drowsy driving. If She feels sleepy while driving, She must pull over for a break/nap, rather than persist on the road, in the interest of She own safety and that of others on the road.   Plan   - Continue ACPAP 5-15 cm EPR 1. Chnaging mask to Eson 2 or MOC per pt    - Compliance download was reviewed and discussed with the pt   - Compliance was reinforced     2. Regarding treatment of other past medical problems and general health maintenance,  She is urged to follow up with PCP.

## 2022-10-06 ENCOUNTER — NON-PROVIDER VISIT (OUTPATIENT)
Dept: MEDICAL GROUP | Facility: LAB | Age: 62
End: 2022-10-06
Payer: COMMERCIAL

## 2022-10-06 DIAGNOSIS — Z23 NEED FOR VACCINATION: ICD-10-CM

## 2022-10-06 PROCEDURE — 99999 PR NO CHARGE: CPT | Performed by: FAMILY MEDICINE

## 2022-10-06 PROCEDURE — 90471 IMMUNIZATION ADMIN: CPT | Performed by: FAMILY MEDICINE

## 2022-10-06 PROCEDURE — 90686 IIV4 VACC NO PRSV 0.5 ML IM: CPT | Performed by: FAMILY MEDICINE

## 2022-10-06 NOTE — PROGRESS NOTES
"Claudia Killian is a 62 y.o. female here for a non-provider visit for:   FLU    Reason for immunization: Annual Flu Vaccine  Immunization records indicate need for vaccine: Yes, confirmed with Epic  Minimum interval has been met for this vaccine: Yes  ABN completed: Not Indicated    VIS Dated  08/06[birth year]19[birth year] was given to patient: Yes  All IAC Questionnaire questions were answered \"No.\"    Patient tolerated injection and no adverse effects were observed or reported: Yes    Pt scheduled for next dose in series: Not Indicated  "

## 2023-01-26 ENCOUNTER — OFFICE VISIT (OUTPATIENT)
Dept: SLEEP MEDICINE | Facility: MEDICAL CENTER | Age: 63
End: 2023-01-26
Payer: COMMERCIAL

## 2023-01-26 VITALS
BODY MASS INDEX: 35.24 KG/M2 | DIASTOLIC BLOOD PRESSURE: 80 MMHG | SYSTOLIC BLOOD PRESSURE: 106 MMHG | WEIGHT: 224.5 LBS | RESPIRATION RATE: 16 BRPM | OXYGEN SATURATION: 94 % | HEIGHT: 67 IN | HEART RATE: 84 BPM

## 2023-01-26 DIAGNOSIS — G47.33 OSA (OBSTRUCTIVE SLEEP APNEA): ICD-10-CM

## 2023-01-26 PROCEDURE — 99213 OFFICE O/P EST LOW 20 MIN: CPT | Performed by: NURSE PRACTITIONER

## 2023-01-26 ASSESSMENT — FIBROSIS 4 INDEX: FIB4 SCORE: 1.26

## 2023-01-26 ASSESSMENT — PATIENT HEALTH QUESTIONNAIRE - PHQ9: CLINICAL INTERPRETATION OF PHQ2 SCORE: 0

## 2023-01-26 NOTE — PROGRESS NOTES
Chief Complaint   Patient presents with    Apnea     Last Seen 07/26/2022        HPI:      Mrs. Killian is a 61 y/o female patient who is in today for CLARK f/u.  Patient is a retired inpatient hospital pharmacist.  PMH includes prediabetes, hyperlipidemia, obesity, CLARK, never smoker.    Patient was set up with a ResMed auto CPAP machine around May 2022.  Compliance report from 12/27/22-1/25/23 was downloaded and reviewed with the patient which showed autoCPAP 5-15 cmH2O, 100% compliance, 7 hrs 36 min use, AHI of 2.7, moderate mask leak. She is tolerating the pressure and mask well. She goes to bed between 10-11 pm and wakes up at 8 am. She denies morning headache or snoring.  Overall she does feel better when she uses the CPAP and is more rested.  She is staying active and exercising.  She hopes to lose some weight and inquires about repeating sleep study testing to reassess sleep apnea.  Her hope is to one day be able to discontinue CPAP therapy if able.  She denies any new health problems or medications.    Sleep Study History:   HSS from 2/8/22 indicated severe obstructive sleep apnea with  Respiratory Event Index (MISSAEL) of 42.8 per hour and worse in supine sleep with MISSAEL at 73.9.  Apneas: 42 (Obstructive apnea index 4/hr, Central apnea index 1.3 /hr, mixed 0 /hour), Hypopneas: 300. Nocturnal hypoxia: O2 Sat. griselda was 82% and mean O2 sat was 90% and baseline O2 at 93 %. O2 sat was below 88% for 19% of the flow evaluation time. Oxygen Desaturation (>=3%) Index was elevated at 45.2/hr. AVG HR was 65 BPM.    ROS:    Constitutional: Denies fevers, Denies weight changes  Eyes: Denies changes in vision, no eye pain  Ears/Nose/Throat/Mouth: Denies nasal congestion or sore throat   Cardiovascular: Denies chest pain or palpitations   Respiratory: Denies shortness of breath , Denies cough  Gastrointestinal/Hepatic: Denies abdominal pain, nausea, vomiting,   Skin/Breast: Denies rash,   Neurological: Denies headache,  confusion,   Psychiatric: denies mood disorder   Sleep: denies snoring, morning headache      Past Medical History:   Diagnosis Date    Chickenpox     [place] measles        History reviewed. No pertinent surgical history.    Family History   Problem Relation Age of Onset    Dementia Mother     Hypertension Mother        Social History     Socioeconomic History    Marital status:      Spouse name: Not on file    Number of children: Not on file    Years of education: Not on file    Highest education level: Not on file   Occupational History     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    Tobacco Use    Smoking status: Never    Smokeless tobacco: Never   Vaping Use    Vaping Use: Not on file   Substance and Sexual Activity    Alcohol use: Yes     Comment: 5 times a week    Drug use: Never    Sexual activity: Yes     Partners: Male   Other Topics Concern    Not on file   Social History Narrative    Not on file     Social Determinants of Health     Financial Resource Strain: Not on file   Food Insecurity: Not on file   Transportation Needs: Not on file   Physical Activity: Not on file   Stress: Not on file   Social Connections: Not on file   Intimate Partner Violence: Not on file   Housing Stability: Not on file       Allergies as of 01/26/2023    (No Known Allergies)        Vitals:  Vitals:    01/26/23 1418   BP: 106/80   Pulse: 84   Resp: 16   SpO2: 94%       Current medications as of today   Current Outpatient Medications   Medication Sig Dispense Refill    metFORMIN (GLUCOPHAGE) 500 MG Tab        No current facility-administered medications for this visit.         Physical Exam: Limited by COVID-19 precautions.  Appearance: Well developed, well nourished, no acute distress  Eyes: PERRL, EOM intact, sclera white, conjunctiva moist  Ears: no lesions or deformities  Hearing: grossly intact  Nose: no lesions or deformities  Respiratory effort: no intercostal retractions or use of accessory muscles  Extremities: no cyanosis  or edema  Abdomen: soft   Gait and Station: normal  Digits and nails: no clubbing, cyanosis, petechiae or nodes.  Cranial nerves: grossly intact  Skin: no visible rashes, lesions or ulcers noted  Orientation: Oriented to time, person and place  Mood and affect: mood and affect appropriate, normal interaction with examiner  Judgement: Intact    Assessment:  1. CLARK (obstructive sleep apnea)  DME Mask and Supplies      2. BMI 35.0-35.9,adult  Patient identified as having weight management issue.  Appropriate orders and counseling given.            Plan  Discussed the cardiovascular and neuropsychiatric risks of untreated CLARK; including but not limited to: HTN, DM, MI, ASCVD, CVA, CHF, traffic accidents.     1.  Compliance report from 12/27/22-1/25/23 was downloaded and reviewed with the patient which showed autoCPAP 5-15 cmH2O, 100% compliance, 7 hrs 36 min use, AHI of 2.7, moderate mask leak.  Patient is compliant and benefiting from autoCPAP therapy for management of CLARK. Advised patient to use the CPAP every night for more than four hours for optimal health benefit.    *DME order ([facility]) for mask (Eson 2 mask or MOC) and supplies was placed today. Continue to clean mask and supplies weekly with soap and water, and change supplies per insurance guidelines.     *Consider repeating home study testing in 1 year patient has at least 30 to 50 pounds of weight loss to reassess CLARK.  Patient's initial weight in 2022 when she completed sleep study was 235 pounds.    2. Encouraged a healthy diet and exercise to help with weight loss and management.  Today's weight is 224 pounds with a BMI of 35.16.  If your BMI is 25-29.9 you are overweight. If your BMI is 30 or greater you are obese. To lose weight eat less, move more, or both. Any diet that reduces caloric intake can help with weight loss. Extra weight may reduce your lifespan. Avoid dramatic unsustainable dietary changes that result in the yo-yo effect (down then back up.)   Usually small modifications in diet and exercise are easier to stick with.  3. Sleep hygiene discussed. Recommend keeping a set sleep/wake schedule. Logging enough hours of sleep. Limiting/Avoiding naps. No caffeine after noon and no heavy meals in the evening.   4. Follow up with appropriate healthcare providers for all other medical problems.  5. F/u in 1 year for CLARK, sooner if needed.       MILADYS Bergman.      This dictation was created using voice recognition software. The accuracy of the dictation is limited to the abilities of the software. I expect there may be some errors of grammar and possibly content.

## 2023-07-07 DIAGNOSIS — Z12.11 COLON CANCER SCREENING: ICD-10-CM

## 2023-07-10 ENCOUNTER — APPOINTMENT (OUTPATIENT)
Dept: RADIOLOGY | Facility: MEDICAL CENTER | Age: 63
End: 2023-07-10
Attending: FAMILY MEDICINE
Payer: COMMERCIAL

## 2023-07-10 DIAGNOSIS — Z12.31 VISIT FOR SCREENING MAMMOGRAM: ICD-10-CM

## 2023-07-10 PROCEDURE — 77063 BREAST TOMOSYNTHESIS BI: CPT

## 2023-10-16 ENCOUNTER — OFFICE VISIT (OUTPATIENT)
Dept: URGENT CARE | Facility: CLINIC | Age: 63
End: 2023-10-16
Payer: COMMERCIAL

## 2023-10-16 ENCOUNTER — APPOINTMENT (OUTPATIENT)
Dept: RADIOLOGY | Facility: IMAGING CENTER | Age: 63
End: 2023-10-16
Attending: FAMILY MEDICINE
Payer: COMMERCIAL

## 2023-10-16 VITALS
TEMPERATURE: 97.7 F | SYSTOLIC BLOOD PRESSURE: 128 MMHG | OXYGEN SATURATION: 97 % | HEART RATE: 82 BPM | DIASTOLIC BLOOD PRESSURE: 68 MMHG | HEIGHT: 67 IN | WEIGHT: 197 LBS | RESPIRATION RATE: 18 BRPM | BODY MASS INDEX: 30.92 KG/M2

## 2023-10-16 DIAGNOSIS — S62.525B OPEN NONDISPLACED FRACTURE OF DISTAL PHALANX OF LEFT THUMB, INITIAL ENCOUNTER: ICD-10-CM

## 2023-10-16 DIAGNOSIS — S69.92XA INJURY OF LEFT THUMB, INITIAL ENCOUNTER: ICD-10-CM

## 2023-10-16 PROCEDURE — 99214 OFFICE O/P EST MOD 30 MIN: CPT | Mod: 25 | Performed by: FAMILY MEDICINE

## 2023-10-16 PROCEDURE — 73140 X-RAY EXAM OF FINGER(S): CPT | Mod: TC,FY,LT | Performed by: FAMILY MEDICINE

## 2023-10-16 PROCEDURE — 3074F SYST BP LT 130 MM HG: CPT | Performed by: FAMILY MEDICINE

## 2023-10-16 PROCEDURE — 3078F DIAST BP <80 MM HG: CPT | Performed by: FAMILY MEDICINE

## 2023-10-16 RX ORDER — KETOROLAC TROMETHAMINE 30 MG/ML
30 INJECTION, SOLUTION INTRAMUSCULAR; INTRAVENOUS ONCE
Status: COMPLETED | OUTPATIENT
Start: 2023-10-16 | End: 2023-10-16

## 2023-10-16 RX ORDER — SEMAGLUTIDE 2.68 MG/ML
INJECTION, SOLUTION SUBCUTANEOUS
COMMUNITY
Start: 2023-08-25

## 2023-10-16 RX ORDER — IBUPROFEN 200 MG
400 TABLET ORAL EVERY 6 HOURS PRN
COMMUNITY

## 2023-10-16 RX ADMIN — KETOROLAC TROMETHAMINE 30 MG: 30 INJECTION, SOLUTION INTRAMUSCULAR; INTRAVENOUS at 19:02

## 2023-10-16 ASSESSMENT — FIBROSIS 4 INDEX: FIB4 SCORE: 1.28

## 2023-10-17 NOTE — PROGRESS NOTES
"  Subjective:      63 y.o. female presents to urgent care for injury to her left thumb.  This evening she was throwing out a couch when her left thumb accidentally got pinned between the couch and the dumpster.  She now has constant pain to the area, is described as achy, currently rated 2/10.  She has not yet had anything for the pain.  Last Tdap was 11/3/2020.  She is right-hand dominant.    She denies any other questions or concerns at this time.    Current problem list, medication, and past medical/surgical history were reviewed in Epic.    ROS  See HPI     Objective:      /68 (BP Location: Right arm, Patient Position: Sitting, BP Cuff Size: Adult)   Pulse 82   Temp 36.5 °C (97.7 °F) (Temporal)   Resp 18   Ht 1.702 m (5' 7\")   Wt 89.4 kg (197 lb)   SpO2 97%   BMI 30.85 kg/m²     Physical Exam  Constitutional:       General: She is not in acute distress.     Appearance: She is not diaphoretic.   Cardiovascular:      Rate and Rhythm: Normal rate and regular rhythm.      Heart sounds: Normal heart sounds.   Pulmonary:      Effort: Pulmonary effort is normal. No respiratory distress.      Breath sounds: Normal breath sounds.   Skin:     Comments: There is a small skin tear to the proximal aspect of left thumbnail.  The same area is tender to touch.  She is still able to flex and extend thumb.   Neurological:      Mental Status: She is alert.   Psychiatric:         Mood and Affect: Affect normal.         Judgment: Judgment normal.       Assessment/Plan:     1. Open nondisplaced fracture of distal phalanx of left thumb, initial encounter  2. Injury of left thumb, initial encounter  XRAY showing acute open comminuted intra-articular fracture of the left thumb terminal phalanx with regional soft tissue swelling.  Wound was cleansed today in urgent care.  Triple antibiotic ointment has been applied, followed by dressing.  She was placed in a splint.  Referral to sports medicine for follow-up has been " placed.  - DX-FINGER(S) 2+ LEFT; Future  - Referral to Sports Medicine      Instructed to return to Urgent Care or nearest Emergency Department if symptoms fail to improve, for any change in condition, further concerns, or new concerning symptoms. Patient states understanding of the plan of care and discharge instructions.    Pamela Padron M.D.

## 2023-11-02 ENCOUNTER — APPOINTMENT (OUTPATIENT)
Dept: RADIOLOGY | Facility: IMAGING CENTER | Age: 63
End: 2023-11-02
Attending: FAMILY MEDICINE
Payer: COMMERCIAL

## 2023-11-02 ENCOUNTER — OFFICE VISIT (OUTPATIENT)
Dept: SPORTS MEDICINE | Facility: CLINIC | Age: 63
End: 2023-11-02
Attending: FAMILY MEDICINE
Payer: COMMERCIAL

## 2023-11-02 VITALS
HEART RATE: 82 BPM | WEIGHT: 197 LBS | BODY MASS INDEX: 30.92 KG/M2 | TEMPERATURE: 98.6 F | SYSTOLIC BLOOD PRESSURE: 122 MMHG | OXYGEN SATURATION: 96 % | RESPIRATION RATE: 18 BRPM | HEIGHT: 67 IN | DIASTOLIC BLOOD PRESSURE: 78 MMHG

## 2023-11-02 DIAGNOSIS — S62.502B: ICD-10-CM

## 2023-11-02 PROCEDURE — 3078F DIAST BP <80 MM HG: CPT | Performed by: FAMILY MEDICINE

## 2023-11-02 PROCEDURE — 73140 X-RAY EXAM OF FINGER(S): CPT | Mod: TC,LT

## 2023-11-02 PROCEDURE — 3074F SYST BP LT 130 MM HG: CPT | Performed by: FAMILY MEDICINE

## 2023-11-02 PROCEDURE — 99214 OFFICE O/P EST MOD 30 MIN: CPT | Performed by: FAMILY MEDICINE

## 2023-11-02 ASSESSMENT — ENCOUNTER SYMPTOMS
DIZZINESS: 0
CHILLS: 0
SHORTNESS OF BREATH: 0
NAUSEA: 0
FEVER: 0
VOMITING: 0

## 2023-11-02 NOTE — PROGRESS NOTES
"Chief Complaint   Patient presents with    Finger Fracture     L thumb injury      Subjective     Referred by Pamela Padron M.D.  for evaluation of LEFT thumb pain/injury  DOI 10/16/23  Throwing couch into dumpster and the couch landed on her thumb as she was holding on to the dumpster for balance  Pain at LEFT thumb  Sharp pain  Feels better at rest and immobilized  Initially had night symptoms  No prior injuries or issues to the LEFT hand    Retired Pharmacist  Acitve, pickleball, yoga, j luis, water aerobic, ukulele    Review of Systems   Constitutional:  Negative for chills and fever.   Respiratory:  Negative for shortness of breath.    Cardiovascular:  Negative for chest pain.   Gastrointestinal:  Negative for nausea and vomiting.   Neurological:  Negative for dizziness.     PMH:  has a past medical history of Chickenpox and [place] measles.  MEDS:   Current Outpatient Medications:     OZEMPIC, 2 MG/DOSE, 8 MG/3ML Solution Pen-injector, , Disp: , Rfl:     ibuprofen (MOTRIN) 200 MG Tab, Take 400 mg by mouth every 6 hours as needed., Disp: , Rfl:   ALLERGIES: No Known Allergies  SURGHX: No past surgical history on file.  SOCHX:  reports that she has never smoked. She has never used smokeless tobacco. She reports that she does not currently use alcohol. She reports that she does not use drugs.  FH: Family history was reviewed, no pertinent findings to report    Objective   /78 (BP Location: Left arm, Patient Position: Sitting, BP Cuff Size: Adult)   Pulse 82   Temp 37 °C (98.6 °F) (Temporal)   Resp 18   Ht 1.702 m (5' 7\")   Wt 89.4 kg (197 lb)   SpO2 96%   BMI 30.85 kg/m²     Hand exam    NAD  Alert and oriented    BILATERAL WRIST exam  Range of motion intact  No tenderness along scaphoid, TFCC insertion, distal radius or distal ulna  Tinel's testing is NEGATIVE  The hand is otherwise neurovascularly intact    BILATERAL hand exam  SIGNIFICANT swelling of the LEFT thumb at the distal phalanx " region  Slightly raised nailbed on the LEFT side  Otherwise, NO deformity  Range of motion of all MCP, DIP and PIP joints NORMAL  Pinky opposition NORMAL  Grind test is NEGATIVE  Collateral ligament testing is NORMAL    1. Open displaced fracture of phalanx of left thumb, initial encounter  DX-FINGER(S) 2+ LEFT        DOI 10/16/23  Throwing couch into dumpster and the couch landed on her thumb as she was holding on to the dumpster for balance  Pain at LEFT thumb    REPEAT x-rays in the office TODAY (November 2, 2023) demonstrate stable fracture    Clinically, she seems to be healing  He has excellent strength with resisted extension and flexion at the IP joint of the thumb    Return in about 2 weeks (around 11/16/2023).  For fracture check        11/2/2023 10:22 AM     HISTORY/REASON FOR EXAM:  Pain/Deformity Following Trauma; Verify stability/healing.  .     TECHNIQUE/EXAM DESCRIPTION AND NUMBER OF VIEWS:  3 views of the LEFT fingers.     COMPARISON: 10/16/2023.     FINDINGS:  Bone mineralization is age appropriate. There is a redemonstrated comminuted fracture of the distal first phalanx. There is slight interval increased displacement of a small fracture fragment over the dorsum of the distal phalanx. Alignment appears   otherwise similar to the previous exam. There is no evidence of periosteal reaction or significant bone callus formation to suggest interval healing.     IMPRESSION:     Slight interval change in alignment of one fracture fragment overlying the dorsum of the distal phalanx, otherwise unchanged appearance of the distal first phalangeal comminuted fracture. No evidence of significant interval healing.           Exam Ended: 11/02/23 10:33 AM Last Resulted: 11/02/23 10:37 AM                            10/16/2023 6:34 PM     HISTORY/REASON FOR EXAM:  Pain/Deformity Following Trauma.     TECHNIQUE/EXAM DESCRIPTION AND NUMBER OF VIEWS:  3 views of the LEFT fingers.     COMPARISON: None      FINDINGS:  Bones: Normal bone mineralization. There is an acute open comminuted intra-articular fracture involving the terminal phalanx of the left thumb. Air extends beneath the base of the left thumb nail and there are avulsion fractures at the volar and dorsal   plates of the base of the left thumb terminal phalanx.     Joints: There are marked degenerative changes involving the left first carpometacarpal joint. Osteoarthritic changes are also present in the distal interphalangeal joints of the left second and third fingers.     Soft tissues: Left thumb soft tissue swelling.     IMPRESSION:     Acute open comminuted intra-articular fracture of the left thumb terminal phalanx with regional soft tissue swelling.           Exam Ended: 10/16/23  6:41 PM Last Resulted: 10/16/23  6:45 PM           Interpreted in the office today with the patient    Thank you Pamela Padron M.D. for allowing me to participate in caring for your patient.

## 2023-11-02 NOTE — Clinical Note
Lincoln Santiago thank you for referring Claudia to the sports medicine clinic., Fortunately, her fracture seems to be coming along well. Clinically she is healing We will plan on seeing her back in 2 weeks to see how things are coming along. Hope you are well! Respectfully,  JODI Lara M.D. [facility] [phone]

## 2023-11-27 ENCOUNTER — OFFICE VISIT (OUTPATIENT)
Dept: SPORTS MEDICINE | Facility: CLINIC | Age: 63
End: 2023-11-27
Payer: COMMERCIAL

## 2023-11-27 VITALS
HEART RATE: 70 BPM | OXYGEN SATURATION: 99 % | BODY MASS INDEX: 30.92 KG/M2 | TEMPERATURE: 97.7 F | HEIGHT: 67 IN | WEIGHT: 197 LBS | DIASTOLIC BLOOD PRESSURE: 80 MMHG | SYSTOLIC BLOOD PRESSURE: 118 MMHG | RESPIRATION RATE: 16 BRPM

## 2023-11-27 DIAGNOSIS — M25.642 DECREASED RANGE OF MOTION OF LEFT THUMB: ICD-10-CM

## 2023-11-27 DIAGNOSIS — S62.522D OPEN DISPLACED FRACTURE OF DISTAL PHALANX OF LEFT THUMB WITH ROUTINE HEALING, SUBSEQUENT ENCOUNTER: ICD-10-CM

## 2023-11-27 PROCEDURE — 3079F DIAST BP 80-89 MM HG: CPT | Performed by: FAMILY MEDICINE

## 2023-11-27 PROCEDURE — 99213 OFFICE O/P EST LOW 20 MIN: CPT | Performed by: FAMILY MEDICINE

## 2023-11-27 PROCEDURE — 3074F SYST BP LT 130 MM HG: CPT | Performed by: FAMILY MEDICINE

## 2023-11-27 NOTE — Clinical Note
Lincoln Santiago, We saw Claudia back for her thumb injury. Fortunately, she is doing WELL. She does have some mild decreased range of motion of the IP joint, so we recommended some occupational/hand therapy to help her hand function recovery.  Otherwise, from a fracture and wound standpoint, she seems to be on a excellent track. We plan on seeing her back in the clinic on an as-needed basis at this point. Hope you are well! L Respectfully,  JODI Lara M.D. [facility] [place] [phone]

## 2023-11-27 NOTE — PROGRESS NOTES
"Chief Complaint   Patient presents with    Finger Pain     L thumb pain      Subjective     Referred by Pamela Padron M.D.  for evaluation of LEFT thumb pain/injury  DOI 10/16/23  Throwing couch into dumpster and the couch landed on her thumb as she was holding on to the dumpster for balance  Pain at LEFT thumb  Sharp pain  Feels better at rest and immobilized  Initially had night symptoms  No prior injuries or issues to the LEFT hand    Update:  Thumb is doing WELL  Less pain and less swelling    Retired Pharmacist  Acitve, pickleball, yoga, j luis, water aerobic, ukulele    Objective   /80 (BP Location: Left arm, Patient Position: Sitting, BP Cuff Size: Adult)   Pulse 70   Temp 36.5 °C (97.7 °F) (Temporal)   Resp 16   Ht 1.702 m (5' 7\")   Wt 89.4 kg (197 lb)   SpO2 99%   BMI 30.85 kg/m²       Hand exam    NAD  Alert and oriented    BILATERAL hand exam  Moderate swelling of the LEFT thumb at the distal phalanx region  Slightly raised nailbed on the LEFT side  Otherwise, NO deformity  Range of motion of the LEFT IP joint is slightly limited    1. Open displaced fracture of distal phalanx of left thumb with routine healing, subsequent encounter        2. Decreased range of motion of left thumb          DOI 10/16/23  Throwing couch into dumpster and the couch landed on her thumb as she was holding on to the dumpster for balance  Pain at LEFT thumb    REPEAT x-rays in the office (November 2, 2023) demonstrate stable fracture    Clinically, she is doing WELL   She still has some limited motion at the IP joint  Recommend hand/occupational therapy to work on motion once her fracture is completely healed    Since she is doing so well at this point we will have her follow-up on an as-needed basis        11/2/2023 10:22 AM     HISTORY/REASON FOR EXAM:  Pain/Deformity Following Trauma; Verify stability/healing.  .     TECHNIQUE/EXAM DESCRIPTION AND NUMBER OF VIEWS:  3 views of the LEFT fingers.     COMPARISON: " 10/16/2023.     FINDINGS:  Bone mineralization is age appropriate. There is a redemonstrated comminuted fracture of the distal first phalanx. There is slight interval increased displacement of a small fracture fragment over the dorsum of the distal phalanx. Alignment appears   otherwise similar to the previous exam. There is no evidence of periosteal reaction or significant bone callus formation to suggest interval healing.     IMPRESSION:     Slight interval change in alignment of one fracture fragment overlying the dorsum of the distal phalanx, otherwise unchanged appearance of the distal first phalangeal comminuted fracture. No evidence of significant interval healing.           Exam Ended: 11/02/23 10:33 AM Last Resulted: 11/02/23 10:37 AM                            10/16/2023 6:34 PM     HISTORY/REASON FOR EXAM:  Pain/Deformity Following Trauma.     TECHNIQUE/EXAM DESCRIPTION AND NUMBER OF VIEWS:  3 views of the LEFT fingers.     COMPARISON: None     FINDINGS:  Bones: Normal bone mineralization. There is an acute open comminuted intra-articular fracture involving the terminal phalanx of the left thumb. Air extends beneath the base of the left thumb nail and there are avulsion fractures at the volar and dorsal   plates of the base of the left thumb terminal phalanx.     Joints: There are marked degenerative changes involving the left first carpometacarpal joint. Osteoarthritic changes are also present in the distal interphalangeal joints of the left second and third fingers.     Soft tissues: Left thumb soft tissue swelling.     IMPRESSION:     Acute open comminuted intra-articular fracture of the left thumb terminal phalanx with regional soft tissue swelling.           Exam Ended: 10/16/23  6:41 PM Last Resulted: 10/16/23  6:45 PM             Thank you Pamela Padron M.D. for allowing me to participate in caring for your patient.

## 2023-12-18 ENCOUNTER — OCCUPATIONAL THERAPY (OUTPATIENT)
Dept: OCCUPATIONAL THERAPY | Facility: MEDICAL CENTER | Age: 63
End: 2023-12-18
Attending: FAMILY MEDICINE
Payer: COMMERCIAL

## 2023-12-18 DIAGNOSIS — S62.525D CLOSED NONDISPLACED FRACTURE OF DISTAL PHALANX OF LEFT THUMB WITH ROUTINE HEALING, SUBSEQUENT ENCOUNTER: ICD-10-CM

## 2023-12-18 PROCEDURE — 97110 THERAPEUTIC EXERCISES: CPT

## 2023-12-18 PROCEDURE — 97165 OT EVAL LOW COMPLEX 30 MIN: CPT

## 2023-12-18 SDOH — ECONOMIC STABILITY: GENERAL: QUALITY OF LIFE: GOOD

## 2023-12-18 ASSESSMENT — ENCOUNTER SYMPTOMS
PAIN SCALE: 0
PAIN SCALE AT LOWEST: 0
PAIN SCALE AT HIGHEST: 2

## 2023-12-18 NOTE — OP THERAPY EVALUATION
Outpatient Occupational Therapy  HAND THERAPY INITIAL EVALUATION    [facility] Outpatient Occupational Therapy  [address]  [place] [address]  Phone:  [phone]  Fax:  [phone]    Date of Evaluation: 2023    Patient: Claudia Killian  YOB: 1960  MRN: [id number]     Referring Provider: Andrew Lara M.D.  [address] [place] [address] [place] [address]   Referring Diagnosis Open displaced fracture of distal phalanx of left thumb with routine healing, subsequent encounter [S62.522D];Decreased range of motion of left thumb [M25.642]     Time Calculation    Start time: 0800  Stop time: 0845 Time Calculation (min): 45 minutes             Chief Complaint: Hand Problem    Visit Diagnoses     ICD-10-CM   1. Closed nondisplaced fracture of distal phalanx of left thumb with routine healing, subsequent encounter  S62.525D       Subjective:   History of Present Illness:     Mechanism of injury:  10/16/23 Throwing couch into dumpster and the couch landed on her thumb as she was holding on to the dumpster for balance  Pain at LEFT thumb. X-ray indicated acute open comminuted intra-articular fracture of the left thumb terminal phalanx with regional soft tissue swelling. Initially immobilized for about 2-3 weeks. Patient is moving okay, does not have a lot of pain mostly stiffness, is reporting some tenderness at mcp joint.   Quality of life:  Good  Sleep disturbance:  Not disrupted  Pain:     Current pain ratin    At best pain ratin    At worst pain ratin  Social Support:     Lives with:  Spouse  Hand dominance:  Right  Activities of Daily Living:     Patient reported ADL status: Patient is independent with ADLs/IADLs, reports some difficulty with manipulation tasks citing buttons as an example.   Patient Goals:     Patient goals for therapy:  Increased motion      Past Medical History:   Diagnosis Date    Chickenpox     [place] citizen measles      No past  surgical history on file.  Social History     Tobacco Use    Smoking status: Never    Smokeless tobacco: Never   Substance Use Topics    Alcohol use: Not Currently     Comment: 5 times a week     Family and Occupational History     Socioeconomic History    Marital status:      Spouse name: Not on file    Number of children: Not on file    Years of education: Not on file    Highest education level: Not on file   Occupational History     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        Objective     Active Range of Motion     Left Thumb   DIP (extension/flexion): 0 / 40  Opposition: Slight decrease in AROM/PROM at IP joint of thumb, mcp within normal limits, opposition within normal limits, reports mild stiffness, distal thumb swollen in L hand relative to R hand      General Comments     Wrist/Hand Comments  L         Therapeutic Exercises (CPT 27000):     1. thumb abduction, x15, rubber band for resistance    2. index abduction, x15, rubber band for resistance    3. index lift offs on ball, x15, rubber band for resistance    4. adductor release with chip clip, 2:00 min, thenar stretch after    5. IP joint flexion, x15 reps, MCP block, dermal facilitation through joint movement, pt able to return demo self dermal facilitation      Time-based treatments/modalities:  Occupational Therapy Timed Treatment Charges  Therapeutic exercise minutes (CPT 38475): 15 minutes      Assessment and Plan:   Problem list/assessment: abnormal or restricted ROM and decreased coordination    Assessment details:  Pt presents to OT s/p fracture to distal phalanx of L thumb, some residual swelling still present, joint motion is present but limited, this seems primarily due to the residual swelling and associated lack of dermal movement, we were able to facilitate the dermis to increased joint ROM slightly and with less pain/stiffness per pt report, pt was able to return demo this facilitation on herself for home program. She does have some  increased tension in thumb adductor due to using her L thumb in extension for the past 2 months putting increased strain on CMC/MCP, we went over self release and stabilization HEP which she was able to return demo appropriately. Due to high co-pay pt would like to terminate OT services and work on self progress, we agree this is appropriate as she was able to return demo everything very well today, printed info issued for carryover. We will d/c from services, if she does not progress to her satisfaction she is welcome to obtain a new referral.   Barriers to therapy:  None    Goals:   Short Term Goals: independent with HEP performance  Short term goal timespan:  1-2 weeks  Patient progress towards short term goals:  Issued printed HEP, pt return demonstrated cristiano mike    Long Term Goals:   N/A    Plan:   Occupational/Hand Therapy options:  No further treatment needed  Prognosis: good    Frequency:  1x week  Duration in weeks:  1  Duration in visits:  1  Discussed with:  Patient  Plan details:  HEP issued, anticipate ability to self progress as tolerated, no further OT needed       Functional Assessment Used    UEFI 68/80     Referring provider co-signature:  I have reviewed this plan of care and my co-signature certifies the need for services.    Certification Period: 12/18/2023 to  12/25/23    Physician Signature: ________________________________ Date: ______________

## 2024-01-08 ENCOUNTER — APPOINTMENT (OUTPATIENT)
Dept: OCCUPATIONAL THERAPY | Facility: MEDICAL CENTER | Age: 64
End: 2024-01-08
Attending: FAMILY MEDICINE
Payer: COMMERCIAL

## 2024-01-15 ENCOUNTER — APPOINTMENT (OUTPATIENT)
Dept: OCCUPATIONAL THERAPY | Facility: MEDICAL CENTER | Age: 64
End: 2024-01-15
Attending: FAMILY MEDICINE
Payer: COMMERCIAL

## 2024-01-22 ENCOUNTER — APPOINTMENT (OUTPATIENT)
Dept: OCCUPATIONAL THERAPY | Facility: MEDICAL CENTER | Age: 64
End: 2024-01-22
Attending: FAMILY MEDICINE
Payer: COMMERCIAL

## 2024-01-29 ENCOUNTER — APPOINTMENT (OUTPATIENT)
Dept: OCCUPATIONAL THERAPY | Facility: MEDICAL CENTER | Age: 64
End: 2024-01-29
Attending: FAMILY MEDICINE
Payer: COMMERCIAL

## 2024-02-05 ENCOUNTER — APPOINTMENT (OUTPATIENT)
Dept: OCCUPATIONAL THERAPY | Facility: MEDICAL CENTER | Age: 64
End: 2024-02-05
Attending: FAMILY MEDICINE
Payer: COMMERCIAL

## 2024-02-12 ENCOUNTER — APPOINTMENT (OUTPATIENT)
Dept: OCCUPATIONAL THERAPY | Facility: MEDICAL CENTER | Age: 64
End: 2024-02-12
Attending: FAMILY MEDICINE
Payer: COMMERCIAL

## 2024-02-19 ENCOUNTER — APPOINTMENT (OUTPATIENT)
Dept: OCCUPATIONAL THERAPY | Facility: MEDICAL CENTER | Age: 64
End: 2024-02-19
Attending: FAMILY MEDICINE
Payer: COMMERCIAL

## 2024-02-26 ENCOUNTER — APPOINTMENT (OUTPATIENT)
Dept: OCCUPATIONAL THERAPY | Facility: MEDICAL CENTER | Age: 64
End: 2024-02-26
Attending: FAMILY MEDICINE
Payer: COMMERCIAL

## 2024-05-25 ENCOUNTER — HOSPITAL ENCOUNTER (OUTPATIENT)
Dept: RADIOLOGY | Facility: MEDICAL CENTER | Age: 64
End: 2024-05-25
Attending: PHYSICIAN ASSISTANT
Payer: COMMERCIAL
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
## 2024-05-25 DIAGNOSIS — M25.532 LEFT WRIST PAIN: ICD-10-CM

## 2024-05-25 DIAGNOSIS — S52.502A CLOSED FRACTURE OF DISTAL END OF LEFT RADIUS, UNSPECIFIED FRACTURE MORPHOLOGY, INITIAL ENCOUNTER: ICD-10-CM

## 2024-05-25 DIAGNOSIS — S52.502A CLOSED TRAUMATIC DISPLACED FRACTURE OF DISTAL END OF LEFT RADIUS, INITIAL ENCOUNTER: ICD-10-CM

## 2024-05-25 DIAGNOSIS — S52.572A CLOSED INTRA-ARTICULAR DIE-PUNCH FRACTURE OF LEFT RADIUS, INITIAL ENCOUNTER: ICD-10-CM

## 2024-05-25 DIAGNOSIS — T14.90XA SPORTS INJURY: ICD-10-CM

## 2024-07-08 ENCOUNTER — PATIENT MESSAGE (OUTPATIENT)
Dept: OBGYN | Facility: CLINIC | Age: 64
End: 2024-07-08
Payer: COMMERCIAL

## 2024-07-12 ENCOUNTER — APPOINTMENT (OUTPATIENT)
Dept: SLEEP MEDICINE | Facility: MEDICAL CENTER | Age: 64
End: 2024-07-12
Attending: NURSE PRACTITIONER
Payer: COMMERCIAL

## 2024-07-15 ENCOUNTER — DOCUMENTATION (OUTPATIENT)
Dept: HEALTH INFORMATION MANAGEMENT | Facility: OTHER | Age: 64
End: 2024-07-15
Payer: COMMERCIAL

## 2024-08-21 ENCOUNTER — OFFICE VISIT (OUTPATIENT)
Dept: SLEEP MEDICINE | Facility: MEDICAL CENTER | Age: 64
End: 2024-08-21
Attending: NURSE PRACTITIONER
Payer: COMMERCIAL

## 2024-08-21 VITALS
BODY MASS INDEX: 29.82 KG/M2 | OXYGEN SATURATION: 99 % | SYSTOLIC BLOOD PRESSURE: 138 MMHG | HEIGHT: 67 IN | DIASTOLIC BLOOD PRESSURE: 60 MMHG | RESPIRATION RATE: 14 BRPM | WEIGHT: 190 LBS | HEART RATE: 73 BPM

## 2024-08-21 DIAGNOSIS — G47.33 OSA (OBSTRUCTIVE SLEEP APNEA): ICD-10-CM

## 2024-08-21 PROCEDURE — 99214 OFFICE O/P EST MOD 30 MIN: CPT | Performed by: NURSE PRACTITIONER

## 2024-08-21 PROCEDURE — 99212 OFFICE O/P EST SF 10 MIN: CPT | Performed by: NURSE PRACTITIONER

## 2024-08-21 PROCEDURE — 3075F SYST BP GE 130 - 139MM HG: CPT | Performed by: NURSE PRACTITIONER

## 2024-08-21 PROCEDURE — 3078F DIAST BP <80 MM HG: CPT | Performed by: NURSE PRACTITIONER

## 2024-08-21 ASSESSMENT — PATIENT HEALTH QUESTIONNAIRE - PHQ9: CLINICAL INTERPRETATION OF PHQ2 SCORE: 0

## 2024-08-21 NOTE — PROGRESS NOTES
No chief complaint on file.      HPI:  Claudia Killian is a 64 y.o. year old female here today for follow-up on CLARK f/u.  Patient is a retired inpatient hospital pharmacist.  PMH includes prediabetes, hyperlipidemia, obesity, CLARK, never smoker.     Patient was set up with a ResMed auto CPAP machine around May 2022.  Currently on auto CPAP 5 to 15 cm/H2O. she is currently not willing to proceed forward with continuing treating with CPAP at this time.  She wishes to repeat sleep study as there has been an weight loss of greater than 40 pounds last visit.  She would like to stop using CPAP, but wants to verify that she no longer needs it with a home sleep study.         Sleep Study History:   HSS from 2/8/22 indicated severe obstructive sleep apnea with  Respiratory Event Index (MISSAEL) of 42.8 per hour and worse in supine sleep with MISSAEL at 73.9.  Apneas: 42 (Obstructive apnea index 4/hr, Central apnea index 1.3 /hr, mixed 0 /hour), Hypopneas: 300. Nocturnal hypoxia: O2 Sat. griselda was 82% and mean O2 sat was 90% and baseline O2 at 93 %. O2 sat was below 88% for 19% of the flow evaluation time. Oxygen Desaturation (>=3%) Index was elevated at 45.2/hr. AVG HR was 65 BPM.    ROS: As per HPI and otherwise negative if not stated.    Past Medical History:   Diagnosis Date    Chickenpox     Frisian measles        No past surgical history on file.    Family History   Problem Relation Age of Onset    Dementia Mother     Hypertension Mother        Allergies as of 08/21/2024    (No Known Allergies)        Vitals:  There were no vitals taken for this visit.    Current medications as of today   Current Outpatient Medications   Medication Sig Dispense Refill    MOUNJARO 10 MG/0.5ML Solution Pen-injector       OZEMPIC, 2 MG/DOSE, 8 MG/3ML Solution Pen-injector       ibuprofen (MOTRIN) 200 MG Tab Take 400 mg by mouth every 6 hours as needed.       No current facility-administered medications for this visit.         Physical Exam:   Gen:            Alert and oriented, No apparent distress. Mood and affect appropriate, normal interaction with examiner.  Eyes:          PERRL, EOM intact, sclere white, conjunctive moist.  Ears:          Not examined.   Hearing:     Grossly intact.  Nose:          Normal, no lesions or deformities.  Dentition:    Good dentition.  Oropharynx:   Tongue normal, posterior pharynx without erythema or exudate.  Neck:        Supple, trachea midline, no masses.  Respiratory Effort: No intercostal retractions or use of accessory muscles.   Lung Auscultation:      Clear to auscultation bilaterally; no rales, rhonchi or wheezing.  CV:            Regular rate and rhythm. No murmurs, rubs or gallops.  Abd:           Not examined.   Lymphadenopathy: Not examined.  Gait and Station: Normal.  Digits and Nails: No clubbing, cyanosis, petechiae, or nodes.   Cranial Nerves: II-XII grossly intact.  Skin:        No rashes, lesions or ulcers noted.               Ext:           No cyanosis or edema.      Assessment:  1. CLARK (obstructive sleep apnea)          Plan:   I reviewed with the patient the pathophysiology of obstructive sleep apnea, as well as potential cardiac and neurologic risks associated with untreated sleep apnea including CAD, HTN, pulmonary arterial hypertension, cardiac arrhythmias, heart attack or stroke.  CLARK patient's have increased risk of motor vehicle accidents, DM type II, chronic kidney disease and nonalcoholic liver disease.  He is cautioned against driving while sleepy for his safety and safety of others on the road. We reviewed treatment modalities for sleep apnea including CPAP/BiPAP therapy, ENT referral, dental appliance.      Known history of sleep apnea.  Seems intolerant to CPAP and wishes to discontinue therapy.  Wishes to verify improvement in condition with repeat home sleep study.  Inspire device was also discussed at length, and patient declines interest at this time.    Please note that this dictation was  created using voice recognition software. I have made every reasonable attempt to correct obvious errors, but it is possible there are errors of grammar and possibly content that I did not discover before finalizing the note.

## 2024-08-26 ENCOUNTER — OFFICE VISIT (OUTPATIENT)
Dept: MEDICAL GROUP | Facility: MEDICAL CENTER | Age: 64
End: 2024-08-26
Payer: COMMERCIAL

## 2024-08-26 VITALS
TEMPERATURE: 97.1 F | DIASTOLIC BLOOD PRESSURE: 70 MMHG | SYSTOLIC BLOOD PRESSURE: 116 MMHG | HEART RATE: 75 BPM | WEIGHT: 194 LBS | OXYGEN SATURATION: 96 % | HEIGHT: 67 IN | RESPIRATION RATE: 18 BRPM | BODY MASS INDEX: 30.45 KG/M2

## 2024-08-26 DIAGNOSIS — Z11.4 ENCOUNTER FOR SCREENING FOR HIV: ICD-10-CM

## 2024-08-26 DIAGNOSIS — S62.109D CLOSED FRACTURE OF WRIST WITH ROUTINE HEALING, UNSPECIFIED LATERALITY, SUBSEQUENT ENCOUNTER: ICD-10-CM

## 2024-08-26 DIAGNOSIS — R73.03 PREDIABETES: ICD-10-CM

## 2024-08-26 DIAGNOSIS — Z00.00 ENCOUNTER FOR MEDICAL EXAMINATION TO ESTABLISH CARE: ICD-10-CM

## 2024-08-26 DIAGNOSIS — R53.82 CHRONIC FATIGUE: ICD-10-CM

## 2024-08-26 DIAGNOSIS — M19.049 HAND ARTHRITIS: ICD-10-CM

## 2024-08-26 DIAGNOSIS — E78.49 OTHER HYPERLIPIDEMIA: ICD-10-CM

## 2024-08-26 PROBLEM — K62.5 RECTAL BLEEDING: Status: RESOLVED | Noted: 2020-10-27 | Resolved: 2024-08-26

## 2024-08-26 NOTE — PROGRESS NOTES
Subjective:     CC:  Diagnoses of Encounter for medical examination to establish care, Chronic fatigue, Closed fracture of wrist with routine healing, unspecified laterality, subsequent encounter, Prediabetes, Other hyperlipidemia, Hand arthritis, and Encounter for screening for HIV were pertinent to this visit.    HISTORY OF THE PRESENT ILLNESS: Patient is a 64 y.o. female. This pleasant patient is here today to establish care and discuss the following.    First, she was hoping to get a bone density scan.  She recently had a fall with a wrist fracture and orthopedics suggested she get a bone density scan to make sure she does not have any osteoporosis or osteopenia.    She has a history of chronic fatigue.  Initially she attributed this to her working hours but now that she is not working anymore she continues to have fatigue.  She has not had labs in several years    Problem   Hand Arthritis    This is a chronic condition, worsening.  Patient states that she has been on anti-inflammatories for many years.  This does help with symptoms but she knows that ultimately she will need to get off of oral anti-inflammatories.  Open to a referral to hand surgery     Other Hyperlipidemia    Chronic condition, The 10-year ASCVD risk score (Marybel DK, et al., 2019) is: 3.9%      Lab Results   Component Value Date/Time    CHOLSTRLTOT 217 (H) 10/27/2021 08:19 AM     (H) 10/27/2021 08:19 AM    HDL 56 10/27/2021 08:19 AM    TRIGLYCERIDE 101 10/27/2021 08:19 AM       Lab Results   Component Value Date/Time    SODIUM 141 10/27/2021 08:19 AM    POTASSIUM 4.4 10/27/2021 08:19 AM    CHLORIDE 105 10/27/2021 08:19 AM    CO2 26 10/27/2021 08:19 AM    GLUCOSE 101 (H) 10/27/2021 08:19 AM    BUN 16 10/27/2021 08:19 AM    CREATININE 0.79 10/27/2021 08:19 AM     Lab Results   Component Value Date/Time    ALKPHOSPHAT 84 10/27/2021 08:19 AM    ASTSGOT 20 10/27/2021 08:19 AM    ALTSGPT 19 10/27/2021 08:19 AM    TBILIRUBIN 0.4 10/27/2021  "08:19 AM         Prediabetes    Chronic condition, noted on chart.  Currently on Mounjaro 10 mg weekly, no recent labs     Rectal Bleeding (Resolved)    Formatting of this note might be different from the original.  Added automatically from request for surgery [id number]       ROS:   ROS      Objective:     Exam: /70 (BP Location: Left arm, Patient Position: Sitting, BP Cuff Size: Adult)   Pulse 75   Temp 36.2 °C (97.1 °F)   Resp 18   Ht 1.702 m (5' 7\")   Wt 88 kg (194 lb)   SpO2 96%  Body mass index is 30.38 kg/m².    Physical Exam  Vitals reviewed.   Constitutional:       General: She is not in acute distress.     Appearance: She is not toxic-appearing.   HENT:      Head: Normocephalic and atraumatic.      Right Ear: External ear normal.      Left Ear: External ear normal.   Eyes:      General:         Right eye: No discharge.         Left eye: No discharge.      Extraocular Movements: Extraocular movements intact.      Conjunctiva/sclera: Conjunctivae normal.   Cardiovascular:      Rate and Rhythm: Normal rate and regular rhythm.      Heart sounds: Normal heart sounds. No murmur heard.  Pulmonary:      Effort: Pulmonary effort is normal. No respiratory distress.      Breath sounds: Normal breath sounds. No wheezing or rales.   Skin:     General: Skin is warm and dry.   Neurological:      Mental Status: She is alert.   Psychiatric:         Mood and Affect: Mood normal.         Behavior: Behavior normal.         Thought Content: Thought content normal.         Judgment: Judgment normal.           Assessment & Plan:   64 y.o. female with the following -    1. Encounter for medical examination to establish care      2. Chronic fatigue  Labs follow-up  - TSH WITH REFLEX TO FT4; Future  - CBC WITH DIFFERENTIAL; Future  - VITAMIN D,25 HYDROXY (DEFICIENCY); Future  - VITAMIN B12; Future    3. Closed fracture of wrist with routine healing, unspecified laterality, subsequent encounter  Bone density scan ordered due " to recent fracture  - DS-BONE DENSITY STUDY (DEXA); Future    4. Prediabetes  Chronic condition, recheck labs  - HEMOGLOBIN A1C; Future    5. Other hyperlipidemia  Chronic condition, recheck labs  - Lipid Profile; Future  - Comp Metabolic Panel; Future    6. Hand arthritis  Referral to orthopedics.  Discussed discontinuing oral anti-inflammatories and starting Voltaren gel  - Referral to Orthopedics    7. Encounter for screening for HIV  - HIV AG/AB COMBO ASSAY SCREENING; Future      No follow-ups on file.    Please note that this dictation was created using voice recognition software. I have made every reasonable attempt to correct obvious errors, but I expect that there are errors of grammar and possibly content that I did not discover before finalizing the note.

## 2024-08-27 ENCOUNTER — HOSPITAL ENCOUNTER (OUTPATIENT)
Dept: LAB | Facility: MEDICAL CENTER | Age: 64
End: 2024-08-27
Attending: STUDENT IN AN ORGANIZED HEALTH CARE EDUCATION/TRAINING PROGRAM
Payer: COMMERCIAL

## 2024-08-27 DIAGNOSIS — R53.82 CHRONIC FATIGUE: ICD-10-CM

## 2024-08-27 DIAGNOSIS — R73.03 PREDIABETES: ICD-10-CM

## 2024-08-27 DIAGNOSIS — Z11.4 ENCOUNTER FOR SCREENING FOR HIV: ICD-10-CM

## 2024-08-27 DIAGNOSIS — E78.49 OTHER HYPERLIPIDEMIA: ICD-10-CM

## 2024-08-27 LAB
25(OH)D3 SERPL-MCNC: 41 NG/ML (ref 30–100)
ALBUMIN SERPL BCP-MCNC: 4.7 G/DL (ref 3.2–4.9)
ALBUMIN/GLOB SERPL: 1.8 G/DL
ALP SERPL-CCNC: 76 U/L (ref 30–99)
ALT SERPL-CCNC: 20 U/L (ref 2–50)
ANION GAP SERPL CALC-SCNC: 12 MMOL/L (ref 7–16)
AST SERPL-CCNC: 22 U/L (ref 12–45)
BASOPHILS # BLD AUTO: 1.4 % (ref 0–1.8)
BASOPHILS # BLD: 0.07 K/UL (ref 0–0.12)
BILIRUB SERPL-MCNC: 0.3 MG/DL (ref 0.1–1.5)
BUN SERPL-MCNC: 14 MG/DL (ref 8–22)
CALCIUM ALBUM COR SERPL-MCNC: 9 MG/DL (ref 8.5–10.5)
CALCIUM SERPL-MCNC: 9.6 MG/DL (ref 8.5–10.5)
CHLORIDE SERPL-SCNC: 104 MMOL/L (ref 96–112)
CHOLEST SERPL-MCNC: 252 MG/DL (ref 100–199)
CO2 SERPL-SCNC: 24 MMOL/L (ref 20–33)
CREAT SERPL-MCNC: 0.85 MG/DL (ref 0.5–1.4)
EOSINOPHIL # BLD AUTO: 0.24 K/UL (ref 0–0.51)
EOSINOPHIL NFR BLD: 4.7 % (ref 0–6.9)
ERYTHROCYTE [DISTWIDTH] IN BLOOD BY AUTOMATED COUNT: 46.5 FL (ref 35.9–50)
EST. AVERAGE GLUCOSE BLD GHB EST-MCNC: 100 MG/DL
GFR SERPLBLD CREATININE-BSD FMLA CKD-EPI: 77 ML/MIN/1.73 M 2
GLOBULIN SER CALC-MCNC: 2.6 G/DL (ref 1.9–3.5)
GLUCOSE SERPL-MCNC: 88 MG/DL (ref 65–99)
HBA1C MFR BLD: 5.1 % (ref 4–5.6)
HCT VFR BLD AUTO: 45.2 % (ref 37–47)
HDLC SERPL-MCNC: 73 MG/DL
HGB BLD-MCNC: 15.2 G/DL (ref 12–16)
HIV 1+2 AB+HIV1 P24 AG SERPL QL IA: NORMAL
IMM GRANULOCYTES # BLD AUTO: 0.01 K/UL (ref 0–0.11)
IMM GRANULOCYTES NFR BLD AUTO: 0.2 % (ref 0–0.9)
LDLC SERPL CALC-MCNC: 158 MG/DL
LYMPHOCYTES # BLD AUTO: 1.72 K/UL (ref 1–4.8)
LYMPHOCYTES NFR BLD: 33.8 % (ref 22–41)
MCH RBC QN AUTO: 32.6 PG (ref 27–33)
MCHC RBC AUTO-ENTMCNC: 33.6 G/DL (ref 32.2–35.5)
MCV RBC AUTO: 97 FL (ref 81.4–97.8)
MONOCYTES # BLD AUTO: 0.46 K/UL (ref 0–0.85)
MONOCYTES NFR BLD AUTO: 9 % (ref 0–13.4)
NEUTROPHILS # BLD AUTO: 2.59 K/UL (ref 1.82–7.42)
NEUTROPHILS NFR BLD: 50.9 % (ref 44–72)
NRBC # BLD AUTO: 0 K/UL
NRBC BLD-RTO: 0 /100 WBC (ref 0–0.2)
PLATELET # BLD AUTO: 251 K/UL (ref 164–446)
PMV BLD AUTO: 9.6 FL (ref 9–12.9)
POTASSIUM SERPL-SCNC: 4.4 MMOL/L (ref 3.6–5.5)
PROT SERPL-MCNC: 7.3 G/DL (ref 6–8.2)
RBC # BLD AUTO: 4.66 M/UL (ref 4.2–5.4)
SODIUM SERPL-SCNC: 140 MMOL/L (ref 135–145)
TRIGL SERPL-MCNC: 103 MG/DL (ref 0–149)
TSH SERPL DL<=0.005 MIU/L-ACNC: 0.89 UIU/ML (ref 0.38–5.33)
VIT B12 SERPL-MCNC: 624 PG/ML (ref 211–911)
WBC # BLD AUTO: 5.1 K/UL (ref 4.8–10.8)

## 2024-08-27 PROCEDURE — 82306 VITAMIN D 25 HYDROXY: CPT

## 2024-08-27 PROCEDURE — 84443 ASSAY THYROID STIM HORMONE: CPT

## 2024-08-27 PROCEDURE — 82607 VITAMIN B-12: CPT

## 2024-08-27 PROCEDURE — 36415 COLL VENOUS BLD VENIPUNCTURE: CPT

## 2024-08-27 PROCEDURE — 80061 LIPID PANEL: CPT

## 2024-08-27 PROCEDURE — 87389 HIV-1 AG W/HIV-1&-2 AB AG IA: CPT

## 2024-08-27 PROCEDURE — 80053 COMPREHEN METABOLIC PANEL: CPT

## 2024-08-27 PROCEDURE — 83036 HEMOGLOBIN GLYCOSYLATED A1C: CPT

## 2024-08-27 PROCEDURE — 85025 COMPLETE CBC W/AUTO DIFF WBC: CPT

## 2024-09-11 ENCOUNTER — HOSPITAL ENCOUNTER (OUTPATIENT)
Dept: RADIOLOGY | Facility: MEDICAL CENTER | Age: 64
End: 2024-09-11
Attending: STUDENT IN AN ORGANIZED HEALTH CARE EDUCATION/TRAINING PROGRAM
Payer: COMMERCIAL

## 2024-09-11 DIAGNOSIS — S62.109D CLOSED FRACTURE OF WRIST WITH ROUTINE HEALING, UNSPECIFIED LATERALITY, SUBSEQUENT ENCOUNTER: ICD-10-CM

## 2024-09-11 PROCEDURE — 77080 DXA BONE DENSITY AXIAL: CPT

## 2024-09-25 ENCOUNTER — APPOINTMENT (OUTPATIENT)
Dept: SLEEP MEDICINE | Facility: MEDICAL CENTER | Age: 64
End: 2024-09-25
Attending: NURSE PRACTITIONER
Payer: COMMERCIAL

## 2024-09-25 DIAGNOSIS — G47.33 OSA (OBSTRUCTIVE SLEEP APNEA): ICD-10-CM

## 2024-09-25 PROCEDURE — 95800 SLP STDY UNATTENDED: CPT | Performed by: STUDENT IN AN ORGANIZED HEALTH CARE EDUCATION/TRAINING PROGRAM

## 2025-01-13 SDOH — ECONOMIC STABILITY: FOOD INSECURITY: WITHIN THE PAST 12 MONTHS, THE FOOD YOU BOUGHT JUST DIDN'T LAST AND YOU DIDN'T HAVE MONEY TO GET MORE.: NEVER TRUE

## 2025-01-13 SDOH — HEALTH STABILITY: PHYSICAL HEALTH: ON AVERAGE, HOW MANY DAYS PER WEEK DO YOU ENGAGE IN MODERATE TO STRENUOUS EXERCISE (LIKE A BRISK WALK)?: 6 DAYS

## 2025-01-13 SDOH — ECONOMIC STABILITY: FOOD INSECURITY: WITHIN THE PAST 12 MONTHS, YOU WORRIED THAT YOUR FOOD WOULD RUN OUT BEFORE YOU GOT MONEY TO BUY MORE.: NEVER TRUE

## 2025-01-13 SDOH — ECONOMIC STABILITY: INCOME INSECURITY: IN THE LAST 12 MONTHS, WAS THERE A TIME WHEN YOU WERE NOT ABLE TO PAY THE MORTGAGE OR RENT ON TIME?: NO

## 2025-01-13 SDOH — ECONOMIC STABILITY: TRANSPORTATION INSECURITY
IN THE PAST 12 MONTHS, HAS THE LACK OF TRANSPORTATION KEPT YOU FROM MEDICAL APPOINTMENTS OR FROM GETTING MEDICATIONS?: NO

## 2025-01-13 SDOH — ECONOMIC STABILITY: TRANSPORTATION INSECURITY
IN THE PAST 12 MONTHS, HAS LACK OF TRANSPORTATION KEPT YOU FROM MEETINGS, WORK, OR FROM GETTING THINGS NEEDED FOR DAILY LIVING?: NO

## 2025-01-13 SDOH — HEALTH STABILITY: MENTAL HEALTH
STRESS IS WHEN SOMEONE FEELS TENSE, NERVOUS, ANXIOUS, OR CAN'T SLEEP AT NIGHT BECAUSE THEIR MIND IS TROUBLED. HOW STRESSED ARE YOU?: NOT AT ALL

## 2025-01-13 SDOH — HEALTH STABILITY: PHYSICAL HEALTH: ON AVERAGE, HOW MANY MINUTES DO YOU ENGAGE IN EXERCISE AT THIS LEVEL?: 60 MIN

## 2025-01-13 SDOH — ECONOMIC STABILITY: TRANSPORTATION INSECURITY
IN THE PAST 12 MONTHS, HAS LACK OF RELIABLE TRANSPORTATION KEPT YOU FROM MEDICAL APPOINTMENTS, MEETINGS, WORK OR FROM GETTING THINGS NEEDED FOR DAILY LIVING?: NO

## 2025-01-13 SDOH — ECONOMIC STABILITY: HOUSING INSECURITY
IN THE LAST 12 MONTHS, WAS THERE A TIME WHEN YOU DID NOT HAVE A STEADY PLACE TO SLEEP OR SLEPT IN A SHELTER (INCLUDING NOW)?: NO

## 2025-01-13 SDOH — ECONOMIC STABILITY: INCOME INSECURITY: HOW HARD IS IT FOR YOU TO PAY FOR THE VERY BASICS LIKE FOOD, HOUSING, MEDICAL CARE, AND HEATING?: NOT HARD AT ALL

## 2025-01-13 ASSESSMENT — SOCIAL DETERMINANTS OF HEALTH (SDOH)
DO YOU BELONG TO ANY CLUBS OR ORGANIZATIONS SUCH AS CHURCH GROUPS UNIONS, FRATERNAL OR ATHLETIC GROUPS, OR SCHOOL GROUPS?: YES
IN A TYPICAL WEEK, HOW MANY TIMES DO YOU TALK ON THE PHONE WITH FAMILY, FRIENDS, OR NEIGHBORS?: MORE THAN THREE TIMES A WEEK
HOW OFTEN DO YOU GET TOGETHER WITH FRIENDS OR RELATIVES?: MORE THAN THREE TIMES A WEEK
WITHIN THE PAST 12 MONTHS, YOU WORRIED THAT YOUR FOOD WOULD RUN OUT BEFORE YOU GOT THE MONEY TO BUY MORE: NEVER TRUE
IN A TYPICAL WEEK, HOW MANY TIMES DO YOU TALK ON THE PHONE WITH FAMILY, FRIENDS, OR NEIGHBORS?: MORE THAN THREE TIMES A WEEK
HOW MANY DRINKS CONTAINING ALCOHOL DO YOU HAVE ON A TYPICAL DAY WHEN YOU ARE DRINKING: 1 OR 2
HOW OFTEN DO YOU ATTEND CHURCH OR RELIGIOUS SERVICES?: MORE THAN 4 TIMES PER YEAR
DO YOU BELONG TO ANY CLUBS OR ORGANIZATIONS SUCH AS CHURCH GROUPS UNIONS, FRATERNAL OR ATHLETIC GROUPS, OR SCHOOL GROUPS?: YES
IN THE PAST 12 MONTHS, HAS THE ELECTRIC, GAS, OIL, OR WATER COMPANY THREATENED TO SHUT OFF SERVICE IN YOUR HOME?: NO
HOW OFTEN DO YOU GET TOGETHER WITH FRIENDS OR RELATIVES?: MORE THAN THREE TIMES A WEEK
HOW OFTEN DO YOU ATTENT MEETINGS OF THE CLUB OR ORGANIZATION YOU BELONG TO?: MORE THAN 4 TIMES PER YEAR
HOW OFTEN DO YOU ATTENT MEETINGS OF THE CLUB OR ORGANIZATION YOU BELONG TO?: MORE THAN 4 TIMES PER YEAR
HOW OFTEN DO YOU ATTEND CHURCH OR RELIGIOUS SERVICES?: MORE THAN 4 TIMES PER YEAR
HOW OFTEN DO YOU HAVE A DRINK CONTAINING ALCOHOL: 2-4 TIMES A MONTH
HOW OFTEN DO YOU HAVE SIX OR MORE DRINKS ON ONE OCCASION: NEVER
HOW HARD IS IT FOR YOU TO PAY FOR THE VERY BASICS LIKE FOOD, HOUSING, MEDICAL CARE, AND HEATING?: NOT HARD AT ALL

## 2025-01-13 ASSESSMENT — LIFESTYLE VARIABLES
SKIP TO QUESTIONS 9-10: 1
HOW OFTEN DO YOU HAVE A DRINK CONTAINING ALCOHOL: 2-4 TIMES A MONTH
HOW OFTEN DO YOU HAVE SIX OR MORE DRINKS ON ONE OCCASION: NEVER
AUDIT-C TOTAL SCORE: 2
HOW MANY STANDARD DRINKS CONTAINING ALCOHOL DO YOU HAVE ON A TYPICAL DAY: 1 OR 2

## 2025-01-14 ENCOUNTER — OFFICE VISIT (OUTPATIENT)
Dept: MEDICAL GROUP | Facility: MEDICAL CENTER | Age: 65
End: 2025-01-14
Payer: COMMERCIAL

## 2025-01-14 ENCOUNTER — HOSPITAL ENCOUNTER (OUTPATIENT)
Facility: MEDICAL CENTER | Age: 65
End: 2025-01-14
Attending: STUDENT IN AN ORGANIZED HEALTH CARE EDUCATION/TRAINING PROGRAM
Payer: COMMERCIAL

## 2025-01-14 VITALS
BODY MASS INDEX: 28.88 KG/M2 | WEIGHT: 184 LBS | TEMPERATURE: 96.9 F | DIASTOLIC BLOOD PRESSURE: 76 MMHG | HEART RATE: 71 BPM | OXYGEN SATURATION: 96 % | HEIGHT: 67 IN | SYSTOLIC BLOOD PRESSURE: 110 MMHG

## 2025-01-14 DIAGNOSIS — Z79.890 HORMONE REPLACEMENT THERAPY (HRT): ICD-10-CM

## 2025-01-14 DIAGNOSIS — Z12.4 SCREENING FOR MALIGNANT NEOPLASM OF CERVIX: ICD-10-CM

## 2025-01-14 DIAGNOSIS — S76.302A LEFT HAMSTRING INJURY, INITIAL ENCOUNTER: ICD-10-CM

## 2025-01-14 DIAGNOSIS — Z01.419 ENCOUNTER FOR ANNUAL ROUTINE GYNECOLOGICAL EXAMINATION: ICD-10-CM

## 2025-01-14 DIAGNOSIS — L65.9 HAIR LOSS: ICD-10-CM

## 2025-01-14 DIAGNOSIS — E78.49 OTHER HYPERLIPIDEMIA: ICD-10-CM

## 2025-01-14 PROCEDURE — 88142 CYTOPATH C/V THIN LAYER: CPT

## 2025-01-14 PROCEDURE — 87624 HPV HI-RISK TYP POOLED RSLT: CPT

## 2025-01-14 PROCEDURE — 3078F DIAST BP <80 MM HG: CPT | Performed by: STUDENT IN AN ORGANIZED HEALTH CARE EDUCATION/TRAINING PROGRAM

## 2025-01-14 PROCEDURE — 3074F SYST BP LT 130 MM HG: CPT | Performed by: STUDENT IN AN ORGANIZED HEALTH CARE EDUCATION/TRAINING PROGRAM

## 2025-01-14 PROCEDURE — 99214 OFFICE O/P EST MOD 30 MIN: CPT | Mod: 25 | Performed by: STUDENT IN AN ORGANIZED HEALTH CARE EDUCATION/TRAINING PROGRAM

## 2025-01-14 PROCEDURE — 99396 PREV VISIT EST AGE 40-64: CPT | Performed by: STUDENT IN AN ORGANIZED HEALTH CARE EDUCATION/TRAINING PROGRAM

## 2025-01-14 RX ORDER — ESTRADIOL 0.5 MG/1
0.5 TABLET ORAL DAILY
Qty: 30 TABLET | Refills: 0 | Status: SHIPPED | OUTPATIENT
Start: 2025-01-14

## 2025-01-14 RX ORDER — IBUPROFEN 200 MG
600 TABLET ORAL DAILY
COMMUNITY
Start: 2024-11-10

## 2025-01-14 RX ORDER — TIRZEPATIDE 12.5 MG/.5ML
12.5 INJECTION, SOLUTION SUBCUTANEOUS
COMMUNITY
Start: 2025-01-13

## 2025-01-14 RX ORDER — PROGESTERONE 100 MG/1
100 CAPSULE ORAL DAILY
Qty: 30 CAPSULE | Refills: 0 | Status: SHIPPED | OUTPATIENT
Start: 2025-01-14

## 2025-01-14 ASSESSMENT — FIBROSIS 4 INDEX: FIB4 SCORE: 1.23

## 2025-01-14 ASSESSMENT — PATIENT HEALTH QUESTIONNAIRE - PHQ9: CLINICAL INTERPRETATION OF PHQ2 SCORE: 0

## 2025-01-14 NOTE — PROGRESS NOTES
"Subjective:     CC: Annual GYN exam with Pap smear, low libido, hamstring injury, hyperlipidemia    HPI:   Claudia presents today with    Problem   Left Hamstring Injury    This is a new condition.  She states that she has been exercising and noticed a pulling sensation in her hamstring.  She has been working on different stretches.  Occasionally it is really bad but now it is not hurting her too much.     Hormone Replacement Therapy (Hrt)    She has been noticing low libido since menopause.  She is wonder if there is anything she can do to increase her libido.  She does not have a personal or family history of endometrial cancer.  No personal history of breast cancer.  No history of blood clots.  No migraines with aura.     Other Hyperlipidemia    Chronic condition, The 10-year ASCVD risk score (Marybel DK, et al., 2019) is: 3.4%      Lab Results   Component Value Date/Time    CHOLSTRLTOT 252 (H) 08/27/2024 08:58 AM     (H) 08/27/2024 08:58 AM    HDL 73 08/27/2024 08:58 AM    TRIGLYCERIDE 103 08/27/2024 08:58 AM       Lab Results   Component Value Date/Time    SODIUM 140 08/27/2024 08:58 AM    POTASSIUM 4.4 08/27/2024 08:58 AM    CHLORIDE 104 08/27/2024 08:58 AM    CO2 24 08/27/2024 08:58 AM    GLUCOSE 88 08/27/2024 08:58 AM    BUN 14 08/27/2024 08:58 AM    CREATININE 0.85 08/27/2024 08:58 AM     Lab Results   Component Value Date/Time    ALKPHOSPHAT 76 08/27/2024 08:58 AM    ASTSGOT 22 08/27/2024 08:58 AM    ALTSGPT 20 08/27/2024 08:58 AM    TBILIRUBIN 0.3 08/27/2024 08:58 AM             ROS:  ROS    Objective:     Exam:  /76 (BP Location: Left arm, Patient Position: Sitting, BP Cuff Size: Adult)   Pulse 71   Temp 36.1 °C (96.9 °F) (Temporal)   Ht 1.702 m (5' 7\")   Wt 83.5 kg (184 lb)   SpO2 96%   BMI 28.82 kg/m²  Body mass index is 28.82 kg/m².    Physical Exam  Vitals reviewed. Exam conducted with a chaperone present (Nuvia ELAINE).   Constitutional:       General: She is not in acute distress.     " Appearance: She is not toxic-appearing.   HENT:      Head: Normocephalic and atraumatic.      Right Ear: External ear normal.      Left Ear: External ear normal.   Eyes:      General:         Right eye: No discharge.         Left eye: No discharge.      Extraocular Movements: Extraocular movements intact.      Conjunctiva/sclera: Conjunctivae normal.   Pulmonary:      Effort: Pulmonary effort is normal. No respiratory distress.   Genitourinary:     Labia:         Right: No rash, tenderness, lesion or injury.         Left: No rash, tenderness, lesion or injury.       Vagina: Normal.      Cervix: Normal.      Uterus: Normal.       Adnexa: Right adnexa normal and left adnexa normal.   Skin:     General: Skin is warm and dry.   Neurological:      Mental Status: She is alert.   Psychiatric:         Mood and Affect: Mood normal.         Behavior: Behavior normal.         Thought Content: Thought content normal.         Judgment: Judgment normal.             Assessment & Plan:     64 y.o. female with the following -     1. Encounter for annual routine gynecological examination  2. Screening for malignant neoplasm of cervix  Normal GYN exam today.  Pap smear completed.  Discussed cervical cancer screening guidelines and that this would be her last Pap smear needed as long as it comes back WNL.  Return in 1 year for annual exam  - THINPREP PAP WITH HPV; Future    3. Left hamstring injury, initial encounter  We discussed referral to physical therapy in case pain worsens or comes back.  - Referral to Physical Therapy    4. Other hyperlipidemia  We reviewed her ASCVD risk, no statin needed at this time    5. Hormone replacement therapy (HRT)  We discussed the risks and benefits of starting hormone replacement therapy.  She will message me in 1 month to let me know how it is going  - estradiol (ESTRACE) 0.5 MG tablet; Take 1 Tablet by mouth every day.  Dispense: 30 Tablet; Refill: 0  - progesterone (PROMETRIUM) 100 MG Cap; Take  1 Capsule by mouth every day.  Dispense: 30 Capsule; Refill: 0    6. Hair loss  Suggested biotin or vitamin supplementation      No follow-ups on file.    Please note that this dictation was created using voice recognition software. I have made every reasonable attempt to correct obvious errors, but I expect that there are errors of grammar and possibly content that I did not discover before finalizing the note.

## 2025-01-17 LAB
HPV I/H RISK 1 DNA SPEC QL NAA+PROBE: NOT DETECTED
SPECIMEN SOURCE: NORMAL
THINPREP PAP, CYTOLOGY NL11781: NORMAL

## 2025-02-09 DIAGNOSIS — Z79.890 HORMONE REPLACEMENT THERAPY (HRT): ICD-10-CM

## 2025-02-10 RX ORDER — ESTRADIOL 0.5 MG/1
0.5 TABLET ORAL DAILY
Qty: 30 TABLET | Refills: 0 | Status: CANCELLED | OUTPATIENT
Start: 2025-02-10

## 2025-02-10 RX ORDER — PROGESTERONE 100 MG/1
100 CAPSULE ORAL DAILY
Qty: 30 CAPSULE | Refills: 0 | OUTPATIENT
Start: 2025-02-10

## 2025-02-10 RX ORDER — ESTRADIOL 1 MG/1
1 TABLET ORAL DAILY
Qty: 30 TABLET | Refills: 0 | Status: SHIPPED | OUTPATIENT
Start: 2025-02-10

## 2025-02-10 RX ORDER — PROGESTERONE 100 MG/1
100 CAPSULE ORAL DAILY
Qty: 90 CAPSULE | Refills: 3 | Status: SHIPPED | OUTPATIENT
Start: 2025-02-10

## 2025-02-10 NOTE — TELEPHONE ENCOUNTER
Patient comment: Little effect w/this dose after almost 30 days. Should i stay at this dose longer or should i try an increased strength??

## 2025-08-11 ENCOUNTER — PATIENT MESSAGE (OUTPATIENT)
Dept: MEDICAL GROUP | Facility: MEDICAL CENTER | Age: 65
End: 2025-08-11
Payer: COMMERCIAL

## 2025-08-11 DIAGNOSIS — Z12.31 ENCOUNTER FOR SCREENING MAMMOGRAM FOR MALIGNANT NEOPLASM OF BREAST: Primary | ICD-10-CM

## 2025-09-22 ENCOUNTER — APPOINTMENT (OUTPATIENT)
Facility: MEDICAL CENTER | Age: 65
End: 2025-09-22
Attending: STUDENT IN AN ORGANIZED HEALTH CARE EDUCATION/TRAINING PROGRAM
Payer: COMMERCIAL